# Patient Record
Sex: FEMALE | Race: WHITE | Employment: OTHER | URBAN - METROPOLITAN AREA
[De-identification: names, ages, dates, MRNs, and addresses within clinical notes are randomized per-mention and may not be internally consistent; named-entity substitution may affect disease eponyms.]

---

## 2017-01-31 DIAGNOSIS — F32.1 MODERATE SINGLE CURRENT EPISODE OF MAJOR DEPRESSIVE DISORDER (H): Primary | ICD-10-CM

## 2017-01-31 NOTE — TELEPHONE ENCOUNTER
Fluoxetine     Last Written Prescription Date: 11/2/16  Last Fill Quantity: 90, # refills: 0  [place] [facility]:  8/10/16        Last PHQ-9 score on record=   PHQ-9 SCORE 7/18/2016   Total Score -   Total Score MyChart -   Total Score 6           .Cony Granado CMA

## 2017-02-01 RX ORDER — FLUOXETINE 40 MG/1
CAPSULE ORAL
Qty: 30 CAPSULE | Refills: 0 | Status: SHIPPED | OUTPATIENT
Start: 2017-02-01 | End: 2017-02-22

## 2017-02-01 NOTE — TELEPHONE ENCOUNTER
Due for update PHQ-9 and CPE per last OV note on 8/10/16 as below    Reviewed health conditions and medications- refilled  Advise to come back for physical exam and fasting labs     Mychart message sent to patient with PHQ-9 questionnaire attached and reminder to f/u for CPE  Awaiting response    Elizabeth Rob RN

## 2017-02-01 NOTE — TELEPHONE ENCOUNTER
appt scheduled with updated PhQ-9.    PHQ-9 SCORE 12/17/2015 7/18/2016 2/1/2017   Total Score - - -   Total Score MyChart - - 5 (Mild depression)   Total Score 5 6 -     Next 5 appointments (look out 90 days)     Feb 22, 2017 10:00 AM   [facility] with Ramandeep Salmeron MD   [facility] ([facility]    [address] [place] [address] [phone]                 30 days supply given for coverage until next scheduled appointment.    Elizabeth Rob RN

## 2017-02-22 ENCOUNTER — OFFICE VISIT (OUTPATIENT)
Dept: FAMILY MEDICINE | Facility: CLINIC | Age: 52
End: 2017-02-22
Payer: COMMERCIAL

## 2017-02-22 VITALS
OXYGEN SATURATION: 97 % | DIASTOLIC BLOOD PRESSURE: 68 MMHG | HEIGHT: 66 IN | WEIGHT: 162.4 LBS | BODY MASS INDEX: 26.1 KG/M2 | SYSTOLIC BLOOD PRESSURE: 110 MMHG | HEART RATE: 94 BPM | TEMPERATURE: 98.3 F

## 2017-02-22 DIAGNOSIS — F32.1 MODERATE SINGLE CURRENT EPISODE OF MAJOR DEPRESSIVE DISORDER (H): ICD-10-CM

## 2017-02-22 DIAGNOSIS — Z00.00 ROUTINE ADULT HEALTH MAINTENANCE: Primary | ICD-10-CM

## 2017-02-22 DIAGNOSIS — Z23 NEED FOR PROPHYLACTIC VACCINATION AND INOCULATION AGAINST INFLUENZA: ICD-10-CM

## 2017-02-22 PROCEDURE — 99396 PREV VISIT EST AGE 40-64: CPT | Mod: 25 | Performed by: INTERNAL MEDICINE

## 2017-02-22 PROCEDURE — 90471 IMMUNIZATION ADMIN: CPT | Performed by: INTERNAL MEDICINE

## 2017-02-22 PROCEDURE — 90686 IIV4 VACC NO PRSV 0.5 ML IM: CPT | Performed by: INTERNAL MEDICINE

## 2017-02-22 RX ORDER — BUPROPION HYDROCHLORIDE 300 MG/1
300 TABLET ORAL EVERY MORNING
Qty: 90 TABLET | Refills: 1 | Status: SHIPPED | OUTPATIENT
Start: 2017-02-22 | End: 2017-11-23

## 2017-02-22 RX ORDER — FLUOXETINE 40 MG/1
CAPSULE ORAL
Qty: 90 CAPSULE | Refills: 1 | Status: SHIPPED | OUTPATIENT
Start: 2017-02-22 | End: 2017-09-01

## 2017-02-22 ASSESSMENT — PATIENT HEALTH QUESTIONNAIRE - PHQ9: 5. POOR APPETITE OR OVEREATING: SEVERAL DAYS

## 2017-02-22 ASSESSMENT — ANXIETY QUESTIONNAIRES
IF YOU CHECKED OFF ANY PROBLEMS ON THIS QUESTIONNAIRE, HOW DIFFICULT HAVE THESE PROBLEMS MADE IT FOR YOU TO DO YOUR WORK, TAKE CARE OF THINGS AT HOME, OR GET ALONG WITH OTHER PEOPLE: SOMEWHAT DIFFICULT
5. BEING SO RESTLESS THAT IT IS HARD TO SIT STILL: NOT AT ALL
3. WORRYING TOO MUCH ABOUT DIFFERENT THINGS: SEVERAL DAYS
7. FEELING AFRAID AS IF SOMETHING AWFUL MIGHT HAPPEN: NOT AT ALL
6. BECOMING EASILY ANNOYED OR IRRITABLE: SEVERAL DAYS
1. FEELING NERVOUS, ANXIOUS, OR ON EDGE: SEVERAL DAYS
2. NOT BEING ABLE TO STOP OR CONTROL WORRYING: SEVERAL DAYS
GAD7 TOTAL SCORE: 5

## 2017-02-22 NOTE — NURSING NOTE
"Chief Complaint   Patient presents with     Physical     fasting       Initial /68 (BP Location: Left arm, Patient Position: Chair, Cuff Size: Adult Regular)  Pulse 94  Temp 98.3  F (36.8  C) (Tympanic)  Ht 5' 6\" (1.676 m)  Wt 162 lb 6.4 oz (73.7 kg)  SpO2 97%  BMI 26.21 kg/m2 Estimated body mass index is 26.21 kg/(m^2) as calculated from the following:    Height as of this encounter: 5' 6\" (1.676 m).    Weight as of this encounter: 162 lb 6.4 oz (73.7 kg).  Medication Reconciliation: complete  "

## 2017-02-22 NOTE — PROGRESS NOTES
SUBJECTIVE:     CC: Shanique Delgado is an 51 year old woman who presents for preventive health visit.     Lives with  and two step children, ages 9 and 12.  She also has 3 grown children and 2 grandchildren. 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      Healthy Habits:  Answers for HPI/ROS submitted by the patient on 2/19/2017   Annual Exam:  Getting at least 3 servings of Calcium per day:: Yes  Bi-annual eye exam:: Yes  Dental care twice a year:: Yes  Sleep apnea or symptoms of sleep apnea:: None  Diet:: Regular (no restrictions)  Frequency of exercise:: 2-3 days/week  Taking medications regularly:: Yes  Medication side effects:: None  Additional concerns today:: No  PHQ-2 Depressed: Several days, Several days  PHQ-2 Score: 2  Duration of exercise:: Less than 15 minutes         Depression:  Shanique feels like her symptoms are well controlled on her current regimen of fluoxetine and bupropion      Today's PHQ-2 Score:   PHQ-2 ( 1999 Pf[employer]) 2/19/2017 8/10/2016   Q1: Little interest or pleasure in doing things - 2   Q2: Feeling down, depressed or hopeless - 2   PHQ-2 Score - 4   Little interest or pleasure in doing things Several days -   Feeling down, depressed or hopeless Several days -   PHQ-2 Score 2 -         Abuse: Current or Past(Physical, Sexual or Emotional)- NO  Do you feel safe in your environment - YES    Social History   Substance Use Topics     Smoking status: Never Smoker     Smokeless tobacco: Never Used     Alcohol use 0.0 oz/week     0 Standard drinks or equivalent per week      Comment: few times per week      The patient does not drink >3 drinks per day nor >7 drinks per week.    Recent Labs   Lab Test  12/18/14   1156  12/06/13   0951   CHOL  252*  191   HDL  54  41*   LDL  161*  117   TRIG  183*  163*   CHOLHDLRATIO  4.7  4.6       Reviewed orders with patient.  Reviewed health maintenance and updated orders accordingly - Yes    Mammo Decision Support:  Patient over age 50, mutual decision  "to screen reflected in health maintenance. - last one done about 3 years ago     Pertinent mammograms are reviewed under the imaging tab.  History of abnormal Pap smear: NO - age 30- 65 PAP every 3 years recommended  All Histories reviewed and updated in Epic.      ROS:  C: NEGATIVE for fever, chills, change in weight  I: NEGATIVE for worrisome rashes, moles or lesions  E: NEGATIVE for vision changes or irritation  ENT: NEGATIVE for ear, mouth and throat problems  R: NEGATIVE for significant cough or SOB  B: NEGATIVE for masses, tenderness or discharge  CV: NEGATIVE for chest pain, palpitations or peripheral edema  GI: NEGATIVE for nausea, abdominal pain, heartburn, or change in bowel habits  : NEGATIVE for unusual urinary or vaginal symptoms. Infrequent bleeding on Mirena.  M: NEGATIVE for significant arthralgias or myalgia  N: NEGATIVE for weakness, dizziness or paresthesias  P: NEGATIVE for changes in mood or affect    Problem list, Medication list, Allergies, and Medical/Social/Surgical histories reviewed in [facility] and updated as appropriate.  OBJECTIVE:     /68 (BP Location: Left arm, Patient Position: Chair, Cuff Size: Adult Regular)  Pulse 94  Temp 98.3  F (36.8  C) (Tympanic)  Ht 5' 6\" (1.676 m)  Wt 162 lb 6.4 oz (73.7 kg)  SpO2 97%  BMI 26.21 kg/m2  EXAM:GENERAL APPEARANCE: healthy, alert and no distress  EYES: Eyes grossly normal to inspection, PERRL and conjunctivae and sclerae normal  HENT: ear canals and TM's normal, mouth without ulcers or lesions, oropharynx clear and oral mucous membranes moist  NECK: no adenopathy, no asymmetry, masses, or scars and thyroid normal to palpation  RESP: lungs clear to auscultation - no rales, rhonchi or wheezes  CV: regular rate and rhythm, normal S1 S2, no S3 or S4, no murmur, click or rub, no peripheral edema and peripheral pulses strong  ABDOMEN: soft, nontender, no hepatosplenomegaly, no masses and bowel sounds normal  MS: no musculoskeletal defects are noted " "and gait is age appropriate without ataxia  SKIN: no suspicious lesions or rashes  NEURO: Normal strength and tone, sensory exam grossly normal, mentation intact and speech normal  PSYCH: mentation appears normal and affect normal/bright    ASSESSMENT/PLAN:         ICD-10-CM    1. Routine adult health maintenance Z00.00    2. Need for prophylactic vaccination and inoculation against influenza Z23    3. Moderate single current episode of major depressive disorder (H) F32.1 FLUoxetine (PROZAC) 40 MG capsule     buPROPion (WELLBUTRIN XL) 300 MG 24 hr tablet   She gets mammograms at the [facility] - will make an appointment to get mammogram soon      COUNSELING:   Reviewed preventive health counseling, as reflected in patient instructions       Regular exercise       Healthy diet/nutrition       Vaccinated for: Influenza       Osteoporosis Prevention/Bone Health       Sun screen         reports that she has never smoked. She has never used smokeless tobacco.    Estimated body mass index is 26.21 kg/(m^2) as calculated from the following:    Height as of this encounter: 5' 6\" (1.676 m).    Weight as of this encounter: 162 lb 6.4 oz (73.7 kg).       Counseling Resources:  ATP IV Guidelines  Pooled Cohorts Equation Calculator  Breast Cancer Risk Calculator  FRAX Risk Assessment  ICSI Preventive Guidelines  Dietary Guidelines for Americans, 2010  USDA's MyPlate  ASA Prophylaxis  Lung CA Screening    Ramandeep Salmeron MD  [facility]  "

## 2017-02-22 NOTE — PROGRESS NOTES
Injectable Influenza Immunization Documentation    1.  Is the person to be vaccinated sick today?  No    2. Does the person to be vaccinated have an allergy to eggs or to a component of the vaccine?  No    3. Has the person to be vaccinated today ever had a serious reaction to influenza vaccine in the past?  No    4. Has the person to be vaccinated ever had Guillain-[place] syndrome?  No     Form completed by Amy Clarke

## 2017-02-22 NOTE — MR AVS SNAPSHOT
After Visit Summary   2/22/2017    Shanique Delgado    MRN: [id number]           Patient Information     Date Of Birth          1965        Visit Information        Provider Department      2/22/2017 10:00 AM Ramandeep Salmeron MD [facility]        Today's Diagnoses     Visit for screening mammogram        Need for prophylactic vaccination and inoculation against influenza        Moderate single current episode of major depressive disorder (H)          Care Instructions      Preventive Health Recommendations  Female Ages 50 - 64    Yearly exam: See your health care provider every year in order to  o Review health changes.   o Discuss preventive care.    o Review your medicines if your doctor has prescribed any.      Get a Pap test every three years (unless you have an abnormal result and your provider advises testing more often).    If you get Pap tests with HPV test, you only need to test every 5 years, unless you have an abnormal result.     You do not need a Pap test if your uterus was removed (hysterectomy) and you have not had cancer.    You should be tested each year for STDs (sexually transmitted diseases) if you're at risk.     Have a mammogram every 1 to 2 years.    Have a colonoscopy at age 50, or have a yearly FIT test (stool test). These exams screen for colon cancer.      Have a cholesterol test every 5 years, or more often if advised.    Have a diabetes test (fasting glucose) every three years. If you are at risk for diabetes, you should have this test more often.     If you are at risk for osteoporosis (brittle bone disease), think about having a bone density scan (DEXA).    Shots: Get a flu shot each year. Get a tetanus shot every 10 years.    Nutrition:     Eat at least 5 servings of fruits and vegetables each day.    Eat whole-grain bread, whole-wheat pasta and brown rice instead of white grains and rice.    Talk to your provider about Calcium and Vitamin  "D.     Lifestyle    Exercise at least 150 minutes a week (30 minutes a day, 5 days a week). This will help you control your weight and prevent disease.    Limit alcohol to one drink per day.    No smoking.     Wear sunscreen to prevent skin cancer.     See your dentist every six months for an exam and cleaning.    See your eye doctor every 1 to 2 years.          Follow-ups after your visit        Follow-up notes from your care team     Return in about 6 months (around 8/22/2017) for depression.      Who to contact     If you have questions or need follow up information about today's clinic visit or your schedule please contact [facility] [place] directly at [phone].  Normal or non-critical lab and imaging results will be communicated to you by [employer], letter or phone within 4 business days after the clinic has received the results. If you do not hear from us within 7 days, please contact the clinic through [employer] or phone. If you have a critical or abnormal lab result, we will notify you by phone as soon as possible.  Submit refill requests through [employer] or call your pharmacy and they will forward the refill request to us. Please allow 3 business days for your refill to be completed.          Additional Information About Your Visit        [employer] Information     [employer] gives you secure access to your electronic health record. If you see a primary care provider, you can also send messages to your care team and make appointments. If you have questions, please call your primary care clinic.  If you do not have a primary care provider, please call [phone] and they will assist you.        Care EveryWhere ID     This is your Care EveryWhere ID. This could be used by other organizations to access your [facility] medical records  [id number]        Your Vitals Were     Pulse Temperature Height Pulse Oximetry BMI (Body Mass Index)       94 98.3  F (36.8  C) (Tympanic) 5' 6\" (1.676 m) 97% 26.21 kg/m2  "       Blood Pressure from Last 3 Encounters:   02/22/17 110/68   08/10/16 109/75   03/28/16 108/76    Weight from Last 3 Encounters:   02/22/17 162 lb 6.4 oz (73.7 kg)   08/10/16 158 lb (71.7 kg)   03/28/16 158 lb (71.7 kg)              Today, you had the following     No orders found for display         Today's Medication Changes          These changes are accurate as of: 2/22/17 10:23 AM.  If you have any questions, ask your nurse or doctor.               These medicines have changed or have updated prescriptions.        Dose/Directions    buPROPion 300 MG 24 hr tablet   Commonly known as:  WELLBUTRIN XL   This may have changed:  See the new instructions.   Used for:  Moderate single current episode of major depressive disorder (H)   Changed by:  Ramandeep Salmeron MD        Dose:  300 mg   Take 1 tablet (300 mg) by mouth every morning   Quantity:  90 tablet   Refills:  1       FLUoxetine 40 MG capsule   Commonly known as:  PROzac   This may have changed:  See the new instructions.   Used for:  Moderate single current episode of major depressive disorder (H)   Changed by:  Ramandeep Salmeron MD        TAKE 1 CAPSULE (40 MG) BY MOUTH DAILY   Quantity:  90 capsule   Refills:  1            Where to get your medicines      These medications were sent to [place] [address] [place] [address] [address]     Phone:  [phone]     buPROPion 300 MG 24 hr tablet    FLUoxetine 40 MG capsule                Primary Care Provider Office Phone # Fax #    Jesse Davis PA-C [phone]       [facility] [address] [place] [address] [address]  [place] [address]        Thank you!     Thank you for choosing [facility]  for your care. Our goal is always to provide you with excellent care. Hearing back from our patients is one way we can continue to improve our services. Please take a few minutes to complete the written survey  that you may receive in the mail after your visit with us. Thank you!             Your Updated Medication List - Protect others around you: Learn how to safely use, store and throw away your medicines at www.disposemymeds.org.          This list is accurate as of: 2/22/17 10:23 AM.  Always use your most recent med list.                   Brand Name Dispense Instructions for use    buPROPion 300 MG 24 hr tablet    WELLBUTRIN XL    90 tablet    Take 1 tablet (300 mg) by mouth every morning       FLUoxetine 40 MG capsule    PROzac    90 capsule    TAKE 1 CAPSULE (40 MG) BY MOUTH DAILY       MIRENA (52 MG) 20 MCG/24HR IUD   Generic drug:  levonorgestrel

## 2017-02-23 ASSESSMENT — ANXIETY QUESTIONNAIRES: GAD7 TOTAL SCORE: 5

## 2017-02-23 ASSESSMENT — PATIENT HEALTH QUESTIONNAIRE - PHQ9: SUM OF ALL RESPONSES TO PHQ QUESTIONS 1-9: 4

## 2017-03-27 ENCOUNTER — HOSPITAL ENCOUNTER (EMERGENCY)
Facility: CLINIC | Age: 52
Discharge: HOME | End: 2017-03-27
Attending: EMERGENCY MEDICINE | Admitting: EMERGENCY MEDICINE
Payer: COMMERCIAL

## 2017-03-27 ENCOUNTER — APPOINTMENT (OUTPATIENT)
Dept: GENERAL RADIOLOGY | Facility: CLINIC | Age: 52
End: 2017-03-27
Attending: EMERGENCY MEDICINE
Payer: COMMERCIAL

## 2017-03-27 VITALS
WEIGHT: 160 LBS | SYSTOLIC BLOOD PRESSURE: 144 MMHG | OXYGEN SATURATION: 95 % | TEMPERATURE: 98.1 F | DIASTOLIC BLOOD PRESSURE: 94 MMHG | RESPIRATION RATE: 21 BRPM | HEIGHT: 65 IN | BODY MASS INDEX: 26.66 KG/M2

## 2017-03-27 DIAGNOSIS — K21.9 GASTROESOPHAGEAL REFLUX DISEASE, ESOPHAGITIS PRESENCE NOT SPECIFIED: ICD-10-CM

## 2017-03-27 DIAGNOSIS — R07.89 CHEST PRESSURE: ICD-10-CM

## 2017-03-27 LAB
ANION GAP SERPL CALCULATED.3IONS-SCNC: 9 MMOL/L (ref 3–14)
BASOPHILS # BLD AUTO: 0 10E9/L (ref 0–0.2)
BASOPHILS NFR BLD AUTO: 0.4 %
BUN SERPL-MCNC: 12 MG/DL (ref 7–30)
CALCIUM SERPL-MCNC: 9.2 MG/DL (ref 8.5–10.1)
CHLORIDE SERPL-SCNC: 104 MMOL/L (ref 94–109)
CO2 SERPL-SCNC: 25 MMOL/L (ref 20–32)
CREAT SERPL-MCNC: 0.73 MG/DL (ref 0.52–1.04)
DIFFERENTIAL METHOD BLD: NORMAL
EOSINOPHIL # BLD AUTO: 0.3 10E9/L (ref 0–0.7)
EOSINOPHIL NFR BLD AUTO: 3.6 %
ERYTHROCYTE [DISTWIDTH] IN BLOOD BY AUTOMATED COUNT: 12.4 % (ref 10–15)
GFR SERPL CREATININE-BSD FRML MDRD: 85 ML/MIN/1.7M2
GLUCOSE SERPL-MCNC: 87 MG/DL (ref 70–99)
HCT VFR BLD AUTO: 40 % (ref 35–47)
HGB BLD-MCNC: 14.1 G/DL (ref 11.7–15.7)
IMM GRANULOCYTES # BLD: 0 10E9/L (ref 0–0.4)
IMM GRANULOCYTES NFR BLD: 0.5 %
INTERPRETATION ECG - MUSE: NORMAL
LYMPHOCYTES # BLD AUTO: 2.6 10E9/L (ref 0.8–5.3)
LYMPHOCYTES NFR BLD AUTO: 34 %
MCH RBC QN AUTO: 31.4 PG (ref 26.5–33)
MCHC RBC AUTO-ENTMCNC: 35.3 G/DL (ref 31.5–36.5)
MCV RBC AUTO: 89 FL (ref 78–100)
MONOCYTES # BLD AUTO: 0.4 10E9/L (ref 0–1.3)
MONOCYTES NFR BLD AUTO: 5.2 %
NEUTROPHILS # BLD AUTO: 4.3 10E9/L (ref 1.6–8.3)
NEUTROPHILS NFR BLD AUTO: 56.3 %
NRBC # BLD AUTO: 0 10*3/UL
NRBC BLD AUTO-RTO: 0 /100
PLATELET # BLD AUTO: 258 10E9/L (ref 150–450)
POTASSIUM SERPL-SCNC: 3.8 MMOL/L (ref 3.4–5.3)
RBC # BLD AUTO: 4.49 10E12/L (ref 3.8–5.2)
SODIUM SERPL-SCNC: 138 MMOL/L (ref 133–144)
TROPONIN I SERPL-MCNC: NORMAL UG/L (ref 0–0.04)
TROPONIN I SERPL-MCNC: NORMAL UG/L (ref 0–0.04)
WBC # BLD AUTO: 7.6 10E9/L (ref 4–11)

## 2017-03-27 PROCEDURE — 71020 XR CHEST 2 VW: CPT

## 2017-03-27 PROCEDURE — 25000132 ZZH RX MED GY IP 250 OP 250 PS 637: Performed by: EMERGENCY MEDICINE

## 2017-03-27 PROCEDURE — 80048 BASIC METABOLIC PNL TOTAL CA: CPT | Performed by: EMERGENCY MEDICINE

## 2017-03-27 PROCEDURE — 99285 EMERGENCY DEPT VISIT HI MDM: CPT | Mod: 25

## 2017-03-27 PROCEDURE — 25000125 ZZHC RX 250: Performed by: EMERGENCY MEDICINE

## 2017-03-27 PROCEDURE — 84484 ASSAY OF TROPONIN QUANT: CPT | Mod: 91 | Performed by: EMERGENCY MEDICINE

## 2017-03-27 PROCEDURE — 85025 COMPLETE CBC W/AUTO DIFF WBC: CPT | Performed by: EMERGENCY MEDICINE

## 2017-03-27 PROCEDURE — 93005 ELECTROCARDIOGRAM TRACING: CPT

## 2017-03-27 RX ORDER — ASPIRIN 81 MG/1
162 TABLET, CHEWABLE ORAL ONCE
Status: COMPLETED | OUTPATIENT
Start: 2017-03-27 | End: 2017-03-27

## 2017-03-27 RX ADMIN — ASPIRIN 81 MG 162 MG: 81 TABLET ORAL at 18:53

## 2017-03-27 RX ADMIN — LIDOCAINE HYDROCHLORIDE 30 ML: 20 SOLUTION ORAL; TOPICAL at 18:53

## 2017-03-27 ASSESSMENT — ENCOUNTER SYMPTOMS
ABDOMINAL PAIN: 0
COUGH: 1
SHORTNESS OF BREATH: 0
FEVER: 0

## 2017-03-27 NOTE — ED NOTES
IV inserted on first attempt. Blood specimens obtained and sent to lab. EKG has been completed and reviewed by MD. Call bell within reach.  at bedside.

## 2017-03-27 NOTE — ED NOTES
"Pt presents to ED with c/o sternal/left sided chest pain. She reports having intermittent \"heartburn\" for several hours last night. When she woke up this morning, the heartburn was gone. At approximately 10am while at rest she developed sternal chest pain that radiated into the right side of her neck. (+) nausea. (-) vomiting, shortness of breath, pleuritic pain, recent illness. LMP = amenorrhea due to IUD. Denies mother/father/sibling cardiac disease (other than father having pacemaker). Denies ETOH or tobacco use, but does report endorsing THC on Saturday. LCTAB. HRRR.  "

## 2017-03-27 NOTE — ED PROVIDER NOTES
"  History     Chief Complaint:  Chest pain    HPI   Shanique Delgado is a 51 year old female who presents with chest pain. Yesterday, the patient experienced heart burn that alleviated on its own. She states that she does not have a history of heart burn. Around 1000 this morning, the patient began experiencing waxing and waning pain in the middle of her chest that radiates into the right side of her jaw and right arm. The pain is not exacerbated with deep breathing or exertion. No abdominal pain. No shortness of breath or leg swelling. The patient does not have a history of blood clots in her legs or lungs. No fevers or cough. No increased stress. The patient took ibuprofen with no relief in her symptoms.     Cardiac Risk Factors   Sex: female   Tobacco: Negative   Hypertension: Negative  Diabetes: Negative  Hyperlipidemia: Negative  Family History: Negative    Allergies:  No known drug allergies.     Medications:    Prozac  Wellbutrin  Mirena     Past Medical History:    Depressive disorder  IUD  Major depression  Anxiety    Past Surgical History:    Cholecystectomy    Family History:    Breast cancer - mother    Social History:  Marital Status:   Presents to the ED with her   Tobacco Use: negative  Alcohol Use: positive  PCP: Jesse Davis    Review of Systems   Constitutional: Negative for fever.   Respiratory: Positive for cough. Negative for shortness of breath.    Cardiovascular: Positive for chest pain. Negative for leg swelling.   Gastrointestinal: Negative for abdominal pain.   All other systems reviewed and are negative.    Physical Exam   First Vitals:  BP: 155/84  Heart Rate: 85  Temp: 98.1  F (36.7  C)  Resp: 18  Height: 165.1 cm (5' 5\")  Weight: 72.6 kg (160 lb)  SpO2: 99 %    Physical Exam  General: Alert and cooperative with exam. Patient in mild distress. Normal mentation  Head:  Scalp is NC/AT  Eyes:  No scleral icterus, PERRL  ENT:  The external nose and ears are normal. " The oropharynx is normal and without erythema; mucus membranes are moist.   CV:  Regular rate and rhythm    No pathologic murmur   Resp:  Breath sounds are clear bilaterally    Non-labored, no retractions or accessory muscle use  GI:  Abdomen is soft, no distension, no tenderness.   MS:  No lower extremity edema   Skin:  Warm and dry, No rash or lesions noted.  Neuro: Oriented x 3. No gross motor deficits.    Emergency Department Course   ECG:  @ 1747  Indication: chest pain  Vent. Rate 85 bpm. MS interval 160 ms. QRS duration 86 ms. QT/QTc 388/461 ms. P-R-T axis 42 7 42.   Normal sinus rhythm. Normal ECG.    Read @ 1827 by Dr. Wise.    Imaging:  Radiographic findings were communicated with the patient who voiced understanding of the findings.    Chest XR, PA & LAT  High attenuation nodule laterally in the mid left thorax  is probably a calcified granuloma about 1 cm diameter. Right lung is  clear. Normal heart size and pulmonary vascularity.   Report per radiology.    Laboratory:  CBC:  WBC 7.6, HGB 14.1, , otherwise WNL  BMP: WNL (Creatinine 0.73)  (1940) Troponin: <0.015  (1805) Troponin: <0.015    Interventions:  (1853) Xylocaine 2% 15mL, Mylanta 15mL PO   (1853) Aspirin, 162 mg, PO    Emergency Department Course:  Nursing notes and vitals reviewed.  I performed an exam of the patient as documented above.  EKG was done, interpretation as above.  A peripheral IV was established. Blood was drawn from the patient. This was sent for laboratory testing, findings above.   The patient was sent for a chest x-ray while in the emergency department, findings above.   Findings and plan explained to the patient. Patient discharged home with instructions regarding supportive care, medications, and reasons to return. The importance of close follow-up was reviewed. The patient was prescribed omeprazole.    Impression & Plan    Medical Decision Making:  Patient is a 51 year old female who presents with chest pressure.  Patient s medical history and records reviewed. Initial consideration for, but not limited to, ACS/MI, esophageal spasm/GERD, musculoskeletal pain, pericarditis, myocarditis, among others. Labs, EKG, and imaging was obtained. EKG shows no evidence of acute ischemia or infarction. Lab unremarkable as noted above including negative troponin x2. Chest x-ray without evidence of acute pathology. Patient was provided 162 mg of aspirin. Was provided a GI cocktail with near complete resolution of symptoms. Patient has no cardiac risk factors. Did consider PE, however dx not clinically fit with patient s history or physical exam and again patient without risk factors for PE. Patient s HEART score equals 2. At this time, I have low suspicion for cardiac etiology/ACS. Presentation most consistent with GERD. Prescribed omeprazole and recommended to take Pepcid as needed for breakthrough symptoms. Patient to follow up with PCP in 3-5 days for reevaluation. Patient discharged to home. Return precautions discussed.    Diagnosis:    ICD-10-CM    1. Chest pressure R07.89    2. Gastroesophageal reflux disease, esophagitis presence not specified K21.9        Disposition:  Discharge to home.    Discharge Medications:  New Prescriptions    OMEPRAZOLE (PRILOSEC) 20 MG CR CAPSULE    Take 1 capsule (20 mg) by mouth daily       Ej GOMEZ, am serving as a scribe on 3/27/2017 at 5:59 PM to personally document services performed by Dr. Wise based on my observations and the provider's statements to me.         Davey Wise, DO  03/28/17 0940

## 2017-03-27 NOTE — ED AVS SNAPSHOT
Emergency Department    [address] [place] [address]    Phone:  [phone]    Fax:  [phone]                                       Shanique Delgado   MRN: [id number]    Department:   Emergency Department   Date of Visit:  3/27/2017           After Visit Summary Signature Page     I have received my discharge instructions, and my questions have been answered. I have discussed any challenges I see with this plan with the nurse or doctor.    ..........................................................................................................................................  Patient/Patient Representative Signature      ..........................................................................................................................................  Patient Representative Print Name and Relationship to Patient    ..................................................               ................................................  Date                                            Time    ..........................................................................................................................................  Reviewed by Signature/Title    ...................................................              ..............................................  Date                                                            Time

## 2017-03-27 NOTE — ED AVS SNAPSHOT
Emergency Department    [address] [facility] [address]    Phone:  [phone]    Fax:  [phone]                                       Shanique Delgado   MRN: [id number]    Department:   Emergency Department   Date of Visit:  3/27/2017           Patient Information     Date Of Birth          1965        Your diagnoses for this visit were:     Chest pressure     Gastroesophageal reflux disease, esophagitis presence not specified        You were seen by Davey Wise DO.      Follow-up Information     Follow up with Jesse Davis PA-C In 3 days.    Specialty:  Physician Assistant    Contact information:    [facility] [place]  [address] [place] [address] DR  [place] [address] [phone]          Follow up with  Emergency Department.    Specialty:  EMERGENCY MEDICINE    Why:  If symptoms worsen    Contact information:    [address] [facility] [id number] [phone]        Discharge Instructions         Tips to Control Acid Reflux  To control acid reflux, you ll need to make some basic diet and lifestyle changes. The simple steps outlined below may be all you ll need to relieve discomfort.  Watch What You Eat      Avoid fatty foods and spicy foods.    Eat fewer acidic foods, such as citrus and tomato-based foods. These can increase symptoms.    Limit drinking alcohol, caffeine, and fizzy beverages. All increase acid reflux.    Try limiting chocolate, peppermint, and spearmint. These can worsen acid reflux in some people.  Watch When You Eat    Avoid lying down for 3 hours after eating.    Do not snack before going to bed.  Raise Your Head    Raising your head and upper body by 4 inches to 6 inches helps limit reflux when you re lying down. Put blocks under the head of the bed frame to raise it.  Other Changes    Lose weight, if you need to.    Don t work out near bedtime.    Avoid tight-fitting clothes.    Limit aspirin and  ibuprofen.    Stop smoking.     5067-1228 [employer]. [address] [place] [address], [place] [address]. All rights reserved. This information is not intended as a substitute for professional medical care. Always follow your healthcare professional's instructions.    Return to the emergency department or seek medical care as instructed if your symptoms fail to improve or significantly worsen.    Take omeprazole daily.    May take famotidine (Pepcid) as needed for breakthrough symptoms     Follow-up as indicated on page 1.    Maintain adequate hydration and get plenty of rest.        Discharge References/Attachments     GERD (ADULT) (ENGLISH)      24 Hour Appointment Hotline       To make an appointment at any [facility] clinic, call [phone] ([phone]). If you don't have a family doctor or clinic, we will help you find one. [facility] clinics are conveniently located to serve the needs of you and your family.             Review of your medicines      START taking        Dose / Directions Last dose taken    omeprazole 20 MG CR capsule   Commonly known as:  priLOSEC   Dose:  20 mg   Quantity:  30 capsule        Take 1 capsule (20 mg) by mouth daily   Refills:  0          Our records show that you are taking the medicines listed below. If these are incorrect, please call your family doctor or clinic.        Dose / Directions Last dose taken    buPROPion 300 MG 24 hr tablet   Commonly known as:  WELLBUTRIN XL   Dose:  300 mg   Quantity:  90 tablet        Take 1 tablet (300 mg) by mouth every morning   Refills:  1        FLUoxetine 40 MG capsule   Commonly known as:  PROzac   Quantity:  90 capsule        TAKE 1 CAPSULE (40 MG) BY MOUTH DAILY   Refills:  1        MIRENA (52 MG) 20 MCG/24HR IUD   Generic drug:  levonorgestrel        Refills:  0                Prescriptions were sent or printed at these locations (1 Prescription)                   Other Prescriptions                Printed at Department/Unit  printer (1 of 1)         omeprazole (PRILOSEC) 20 MG CR capsule                Procedures and tests performed during your visit     Procedure/Test Number of Times Performed    Basic metabolic panel 1    CBC with platelets + differential 1    Chest XR,  PA & LAT 1    EKG 12 lead 1    Troponin I (now) 2      Orders Needing Specimen Collection     None      Pending Results     No orders found from 3/25/2017 to 3/28/2017.            Pending Culture Results     No orders found from 3/25/2017 to 3/28/2017.             Test Results from your hospital stay     3/27/2017  6:15 PM - Interface, Flexilab Results      Component Results     Component Value Ref Range & Units Status    WBC 7.6 4.0 - 11.0 10e9/L Final    RBC Count 4.49 3.8 - 5.2 10e12/L Final    Hemoglobin 14.1 11.7 - 15.7 g/dL Final    Hematocrit 40.0 35.0 - 47.0 % Final    MCV 89 78 - 100 fl Final    MCH 31.4 26.5 - 33.0 pg Final    MCHC 35.3 31.5 - 36.5 g/dL Final    RDW 12.4 10.0 - 15.0 % Final    Platelet Count 258 150 - 450 10e9/L Final    Diff Method Automated Method  Final    % Neutrophils 56.3 % Final    % Lymphocytes 34.0 % Final    % Monocytes 5.2 % Final    % Eosinophils 3.6 % Final    % Basophils 0.4 % Final    % Immature Granulocytes 0.5 % Final    Nucleated RBCs 0 0 /100 Final    Absolute Neutrophil 4.3 1.6 - 8.3 10e9/L Final    Absolute Lymphocytes 2.6 0.8 - 5.3 10e9/L Final    Absolute Monocytes 0.4 0.0 - 1.3 10e9/L Final    Absolute Eosinophils 0.3 0.0 - 0.7 10e9/L Final    Absolute Basophils 0.0 0.0 - 0.2 10e9/L Final    Abs Immature Granulocytes 0.0 0 - 0.4 10e9/L Final    Absolute Nucleated RBC 0.0  Final         3/27/2017  6:28 PM - Interface, Flexilab Results      Component Results     Component Value Ref Range & Units Status    Sodium 138 133 - 144 mmol/L Final    Potassium 3.8 3.4 - 5.3 mmol/L Final    Chloride 104 94 - 109 mmol/L Final    Carbon Dioxide 25 20 - 32 mmol/L Final    Anion Gap 9 3 - 14 mmol/L Final    Glucose 87 70 - 99 mg/dL  Final    Urea Nitrogen 12 7 - 30 mg/dL Final    Creatinine 0.73 0.52 - 1.04 mg/dL Final    GFR Estimate 85 >60 mL/min/1.7m2 Final    Non  GFR Calc    GFR Estimate If Black >90   GFR Calc   >60 mL/min/1.7m2 Final    Calcium 9.2 8.5 - 10.1 mg/dL Final         3/27/2017  6:34 PM - Interface, Flexilab Results      Component Results     Component Value Ref Range & Units Status    Troponin I ES  0.000 - 0.045 ug/L Final    <0.015  The 99th percentile for upper reference range is 0.045 ug/L.  Troponin values in   the range of 0.045 - 0.120 ug/L may be associated with risks of adverse   clinical events.           3/27/2017  7:23 PM - Interface, Radiant Ib      Narrative     CHEST TWO VIEWS  3/27/2017 6:43 PM     HISTORY: Chest pain.    COMPARISON: None.        Impression     IMPRESSION: High attenuation nodule laterally in the mid left thorax  is probably a calcified granuloma about 1 cm diameter. Right lung is  clear. Normal heart size and pulmonary vascularity.     DAYNA LANE MD         3/27/2017  8:13 PM - Interface, Flexilab Results      Component Results     Component Value Ref Range & Units Status    Troponin I ES  0.000 - 0.045 ug/L Final    <0.015  The 99th percentile for upper reference range is 0.045 ug/L.  Troponin values in   the range of 0.045 - 0.120 ug/L may be associated with risks of adverse   clinical events.                  Clinical Quality Measure: Blood Pressure Screening     Your blood pressure was checked while you were in the emergency department today. The last reading we obtained was  BP: 142/89 . Please read the guidelines below about what these numbers mean and what you should do about them.  If your systolic blood pressure (the top number) is less than 120 and your diastolic blood pressure (the bottom number) is less than 80, then your blood pressure is normal. There is nothing more that you need to do about it.  If your systolic blood pressure (the top  number) is 120-139 or your diastolic blood pressure (the bottom number) is 80-89, your blood pressure may be higher than it should be. You should have your blood pressure rechecked within a year by a primary care provider.  If your systolic blood pressure (the top number) is 140 or greater or your diastolic blood pressure (the bottom number) is 90 or greater, you may have high blood pressure. High blood pressure is treatable, but if left untreated over time it can put you at risk for heart attack, stroke, or kidney failure. You should have your blood pressure rechecked by a primary care provider within the next 4 weeks.  If your provider in the emergency department today gave you specific instructions to follow-up with your doctor or provider even sooner than that, you should follow that instruction and not wait for up to 4 weeks for your follow-up visit.        Thank you for choosing [facility]       Thank you for choosing [facility] for your care. Our goal is always to provide you with excellent care. Hearing back from our patients is one way we can continue to improve our services. Please take a few minutes to complete the written survey that you may receive in the mail after you visit with us. Thank you!        [employer] Information     [employer] gives you secure access to your electronic health record. If you see a primary care provider, you can also send messages to your care team and make appointments. If you have questions, please call your primary care clinic.  If you do not have a primary care provider, please call [phone] and they will assist you.        Care EveryWhere ID     This is your Care EveryWhere ID. This could be used by other organizations to access your [facility] medical records  [id number]        After Visit Summary       This is your record. Keep this with you and show to your community pharmacist(s) and doctor(s) at your next visit.

## 2017-03-28 NOTE — DISCHARGE INSTRUCTIONS
Tips to Control Acid Reflux  To control acid reflux, you ll need to make some basic diet and lifestyle changes. The simple steps outlined below may be all you ll need to relieve discomfort.  Watch What You Eat      Avoid fatty foods and spicy foods.    Eat fewer acidic foods, such as citrus and tomato-based foods. These can increase symptoms.    Limit drinking alcohol, caffeine, and fizzy beverages. All increase acid reflux.    Try limiting chocolate, peppermint, and spearmint. These can worsen acid reflux in some people.  Watch When You Eat    Avoid lying down for 3 hours after eating.    Do not snack before going to bed.  Raise Your Head    Raising your head and upper body by 4 inches to 6 inches helps limit reflux when you re lying down. Put blocks under the head of the bed frame to raise it.  Other Changes    Lose weight, if you need to.    Don t work out near bedtime.    Avoid tight-fitting clothes.    Limit aspirin and ibuprofen.    Stop smoking.     0143-0888 [employer]. [address] [place] [address], [place] [address]. All rights reserved. This information is not intended as a substitute for professional medical care. Always follow your healthcare professional's instructions.    Return to the emergency department or seek medical care as instructed if your symptoms fail to improve or significantly worsen.    Take omeprazole daily.    May take famotidine (Pepcid) as needed for breakthrough symptoms     Follow-up as indicated on page 1.    Maintain adequate hydration and get plenty of rest.

## 2017-03-28 NOTE — ED NOTES
Pt reports improvement, although not complete resolution, of pain post GI cocktail. MD aware. Pending dispo/additional orders.

## 2017-03-28 NOTE — ED NOTES
Patient discharged in stable condition. Patient received follow-up instructions and 1RXs. No questions at time of discharge. IV removed - catheter intact.

## 2017-05-02 ENCOUNTER — TELEPHONE (OUTPATIENT)
Dept: FAMILY MEDICINE | Facility: CLINIC | Age: 52
End: 2017-05-02

## 2017-05-02 NOTE — TELEPHONE ENCOUNTER
5/2/2017    Patient refuse Mammogram and does not want to schedule with [facility]. DO NOT CALL!      Outreach ,  Kirk Adams

## 2017-09-01 ENCOUNTER — MYC MEDICAL ADVICE (OUTPATIENT)
Dept: FAMILY MEDICINE | Facility: CLINIC | Age: 52
End: 2017-09-01

## 2017-09-01 DIAGNOSIS — F32.1 MODERATE SINGLE CURRENT EPISODE OF MAJOR DEPRESSIVE DISORDER (H): ICD-10-CM

## 2017-09-01 ASSESSMENT — PATIENT HEALTH QUESTIONNAIRE - PHQ9
SUM OF ALL RESPONSES TO PHQ QUESTIONS 1-9: 6
SUM OF ALL RESPONSES TO PHQ QUESTIONS 1-9: 6
10. IF YOU CHECKED OFF ANY PROBLEMS, HOW DIFFICULT HAVE THESE PROBLEMS MADE IT FOR YOU TO DO YOUR WORK, TAKE CARE OF THINGS AT HOME, OR GET ALONG WITH OTHER PEOPLE: SOMEWHAT DIFFICULT

## 2017-09-01 NOTE — TELEPHONE ENCOUNTER
Fluoxetine     Last Written Prescription Date: 2/22/17  Last Fill Quantity: 90, # refills: 1  Last Office Visit with [facility] primary care provider:  2/22/17        Last PHQ-9 score on record=   PHQ-9 SCORE 2/22/2017   Total Score -   Total Score MyChart -   Total Score 4             Cony Granado CMA

## 2017-09-02 ASSESSMENT — PATIENT HEALTH QUESTIONNAIRE - PHQ9: SUM OF ALL RESPONSES TO PHQ QUESTIONS 1-9: 6

## 2017-09-05 NOTE — TELEPHONE ENCOUNTER
PHQ-9 SCORE 2/1/2017 2/22/2017 9/1/2017   Total Score - - -   Total Score MyChart 5 (Mild depression) - 6 (Mild depression)   Total Score - 4 6

## 2017-09-06 RX ORDER — FLUOXETINE 40 MG/1
CAPSULE ORAL
Qty: 90 CAPSULE | Refills: 1 | Status: SHIPPED | OUTPATIENT
Start: 2017-09-06 | End: 2018-03-01

## 2017-09-06 NOTE — TELEPHONE ENCOUNTER
Please see [employer] message and advise. Patient requesting a refill in another message.      Thank you,  Julisa Villagomez RN  [facility]  [phone]

## 2017-11-21 ENCOUNTER — MYC REFILL (OUTPATIENT)
Dept: FAMILY MEDICINE | Facility: CLINIC | Age: 52
End: 2017-11-21

## 2017-11-21 DIAGNOSIS — F32.1 MODERATE SINGLE CURRENT EPISODE OF MAJOR DEPRESSIVE DISORDER (H): ICD-10-CM

## 2017-11-21 RX ORDER — FLUOXETINE 40 MG/1
CAPSULE ORAL
Qty: 90 CAPSULE | Refills: 1 | Status: CANCELLED | OUTPATIENT
Start: 2017-11-21

## 2017-11-21 NOTE — TELEPHONE ENCOUNTER
Message from [employer]:  Original authorizing provider: SUMIT Posadas would like a refill of the following medications:  FLUoxetine (PROZAC) 40 MG capsule [Jesse Davis PA-C]    Preferred pharmacy: [facility] [address] [address]    Comment:  Hello! Coming up on needing refills. Please advise. Thanks!    Medication renewals requested in this message routed to other providers:  buPROPion (WELLBUTRIN XL) 300 MG 24 hr tablet [Ramandeep Salmeron MD]

## 2017-11-21 NOTE — TELEPHONE ENCOUNTER
Requested Prescriptions   Pending Prescriptions Disp Refills     buPROPion (WELLBUTRIN XL) 300 MG 24 hr tablet  Last Written Prescription Date:  2/22/2017  Last Fill Quantity: 90 tablet,  # refills: 1   Last Office Visit with [facility], P or [facility] prescribing provider:  2/22/2017   Future Office Visit:      90 tablet 1     Sig: Take 1 tablet (300 mg) by mouth every morning    SSRIs Protocol Failed    11/21/2017 10:07 AM       Failed - PHQ-9 score less than 5 in past 6 months    Please review PHQ-9 score.   PHQ-9 SCORE 2/1/2017 2/22/2017 9/1/2017   Total Score - - -   Total Score MyChart 5 (Mild depression) - 6 (Mild depression)   Total Score - 4 6     NICOLAS-7 SCORE 12/18/2014 6/15/2015 2/22/2017   Total Score 3 4 -   Total Score - - 5          Failed - Medication is NOT Bupropion    If the medication is Bupropion (Wellbutrin), and the patient is taking for smoking cessation; OK to refill.         Failed - Recent (6 mo) or future visit with authorizing provider's specialty    Patient had office visit in the last 6 months or has a visit in the next 30 days with authorizing provider.  See chart review.            Passed - Patient is age 18 or older       Passed - No active pregnancy on record       Passed - No positive pregnancy test in last 12 months

## 2017-11-21 NOTE — TELEPHONE ENCOUNTER
Requested Prescriptions   Pending Prescriptions Disp Refills     FLUoxetine (PROZAC) 40 MG capsule  Medication may not be due for a refill.  Last Written Prescription Date:  9/6/2017  Last Fill Quantity: 90 capsule,  # refills: 1   Last Office Visit with [facility], P or [facility] prescribing provider:  2/22/2017   Future Office Visit:      90 capsule 1    SSRIs Protocol Failed    11/21/2017 10:07 AM       Failed - PHQ-9 score less than 5 in past 6 months    Please review PHQ-9 score.   PHQ-9 SCORE 2/1/2017 2/22/2017 9/1/2017   Total Score - - -   Total Score MyChart 5 (Mild depression) - 6 (Mild depression)   Total Score - 4 6     NICOLAS-7 SCORE 12/18/2014 6/15/2015 2/22/2017   Total Score 3 4 -   Total Score - - 5          Failed - Recent (6 mo) or future visit with authorizing provider's specialty    Patient had office visit in the last 6 months or has a visit in the next 30 days with authorizing provider.  See chart review.            Passed - Medication is NOT Bupropion    If the medication is Bupropion (Wellbutrin), and the patient is taking for smoking cessation; OK to refill.         Passed - Patient is age 18 or older       Passed - No active pregnancy on record       Passed - No positive pregnancy test in last 12 months

## 2017-11-21 NOTE — TELEPHONE ENCOUNTER
Message from [employer]:  Original authorizing provider: MD Freda Pisanocaridad BARRERAOdalys CorderoDelgado would like a refill of the following medications:  buPROPion (WELLBUTRIN XL) 300 MG 24 hr tablet [Ramandeep Salmeron MD]    Preferred pharmacy: [facility] [address] [address]    Comment:  Hello! Coming up on needing refills. Please advise. Thanks!    Medication renewals requested in this message routed to other providers:  FLUoxetine (PROZAC) 40 MG capsule [Jesse Davis PA-C]

## 2017-11-22 RX ORDER — BUPROPION HYDROCHLORIDE 300 MG/1
300 TABLET ORAL EVERY MORNING
Qty: 90 TABLET | Refills: 1 | Status: CANCELLED | OUTPATIENT
Start: 2017-11-22

## 2018-02-21 DIAGNOSIS — F32.1 MODERATE SINGLE CURRENT EPISODE OF MAJOR DEPRESSIVE DISORDER (H): ICD-10-CM

## 2018-02-21 NOTE — TELEPHONE ENCOUNTER
Patient needs updated PHQ-9.  My chart message sent.   Olya Guzman RN - Triage  [facility]

## 2018-02-21 NOTE — TELEPHONE ENCOUNTER
"Requested Prescriptions   Pending Prescriptions Disp Refills     buPROPion (WELLBUTRIN XL) 300 MG 24 hr tablet [Pharmacy Med Name: BUPROPION HCL  MG TABLET]  Last Written Prescription Date:  11/24/17  Last Fill Quantity: 90 TABLET,  # refills: 0   Last office visit: 2/22/2017 with prescribing provider:  2/22/17   Future Office Visit:     90 tablet 0     Sig: TAKE 1 TABLET BY MOUTH EVERY MORNING    SSRIs Protocol Failed    2/21/2018  5:31 AM       Failed - PHQ-9 score less than 5 in past 6 months    The PHQ-9 criteria is meant to fail. It requires a PHQ-9 score review  PHQ-9 SCORE 2/1/2017 2/22/2017 9/1/2017   Total Score - - -   Total Score MyChart 5 (Mild depression) - 6 (Mild depression)   Total Score - 4 6     NICOLAS-7 SCORE 12/18/2014 6/15/2015 2/22/2017   Total Score 3 4 -   Total Score - - 5              Failed - Recent (6 mo) or future visit with authorizing provider's specialty    Patient had office visit in the last 6 months or has a visit in the next 30 days with authorizing provider.  See \"Patient Info\" tab in inbasket, or \"Choose Columns\" in Meds & Orders section of the refill encounter.           Passed - Medication is Bupropion    If the medication is Bupropion (Wellbutrin), and the patient is taking for smoking cessation; OK to refill.         Passed - Patient is age 18 or older       Passed - No active pregnancy on record       Passed - No positive pregnancy test in last 12 months          "

## 2018-02-22 RX ORDER — BUPROPION HYDROCHLORIDE 300 MG/1
TABLET ORAL
Qty: 90 TABLET | Refills: 0 | Status: SHIPPED | OUTPATIENT
Start: 2018-02-22 | End: 2018-03-15

## 2018-02-22 NOTE — TELEPHONE ENCOUNTER
PHQ-9 SCORE 2/22/2017 9/1/2017 2/21/2018   Total Score - - -   Total Score MyChart - 6 (Mild depression) 6 (Mild depression)   Total Score 4 6 6     Routing refill request to provider for review/approval because:  PHQ-9 > [facility] protocol for RN violet Guzman RN - Triage  [facility]

## 2018-02-24 ENCOUNTER — HEALTH MAINTENANCE LETTER (OUTPATIENT)
Age: 53
End: 2018-02-24

## 2018-03-15 ENCOUNTER — OFFICE VISIT (OUTPATIENT)
Dept: FAMILY MEDICINE | Facility: CLINIC | Age: 53
End: 2018-03-15
Payer: COMMERCIAL

## 2018-03-15 VITALS
WEIGHT: 162 LBS | HEART RATE: 80 BPM | HEIGHT: 65 IN | BODY MASS INDEX: 26.99 KG/M2 | DIASTOLIC BLOOD PRESSURE: 87 MMHG | TEMPERATURE: 98.2 F | SYSTOLIC BLOOD PRESSURE: 131 MMHG | OXYGEN SATURATION: 100 %

## 2018-03-15 DIAGNOSIS — F32.1 MODERATE SINGLE CURRENT EPISODE OF MAJOR DEPRESSIVE DISORDER (H): ICD-10-CM

## 2018-03-15 PROCEDURE — 99213 OFFICE O/P EST LOW 20 MIN: CPT | Performed by: INTERNAL MEDICINE

## 2018-03-15 RX ORDER — BUPROPION HYDROCHLORIDE 300 MG/1
300 TABLET ORAL EVERY MORNING
Qty: 90 TABLET | Refills: 1 | Status: SHIPPED | OUTPATIENT
Start: 2018-03-15 | End: 2018-08-28

## 2018-03-15 ASSESSMENT — ANXIETY QUESTIONNAIRES
GAD7 TOTAL SCORE: 5
6. BECOMING EASILY ANNOYED OR IRRITABLE: SEVERAL DAYS
IF YOU CHECKED OFF ANY PROBLEMS ON THIS QUESTIONNAIRE, HOW DIFFICULT HAVE THESE PROBLEMS MADE IT FOR YOU TO DO YOUR WORK, TAKE CARE OF THINGS AT HOME, OR GET ALONG WITH OTHER PEOPLE: SOMEWHAT DIFFICULT
1. FEELING NERVOUS, ANXIOUS, OR ON EDGE: SEVERAL DAYS
5. BEING SO RESTLESS THAT IT IS HARD TO SIT STILL: NOT AT ALL
7. FEELING AFRAID AS IF SOMETHING AWFUL MIGHT HAPPEN: NOT AT ALL
2. NOT BEING ABLE TO STOP OR CONTROL WORRYING: SEVERAL DAYS
3. WORRYING TOO MUCH ABOUT DIFFERENT THINGS: SEVERAL DAYS

## 2018-03-15 ASSESSMENT — PATIENT HEALTH QUESTIONNAIRE - PHQ9: 5. POOR APPETITE OR OVEREATING: SEVERAL DAYS

## 2018-03-15 NOTE — MR AVS SNAPSHOT
After Visit Summary   3/15/2018    Shanique Delgado    MRN: [id number]           Patient Information     Date Of Birth          1965        Visit Information        Provider Department      3/15/2018 11:00 AM [facility]        Today's Diagnoses     Moderate single current episode of major depressive disorder (H)          Care Instructions    Genesite testing:    [facility] at [facility]    [facility]              Follow-ups after your visit        Follow-up notes from your care team     Return in about 6 months (around 9/15/2018) for Mental health.      Who to contact     If you have questions or need follow up information about today's clinic visit or your schedule please contact [facility] [place] directly at [phone].  Normal or non-critical lab and imaging results will be communicated to you by MyChart, letter or phone within 4 business days after the clinic has received the results. If you do not hear from us within 7 days, please contact the clinic through MyChart or phone. If you have a critical or abnormal lab result, we will notify you by phone as soon as possible.  Submit refill requests through [employer] or call your pharmacy and they will forward the refill request to us. Please allow 3 business days for your refill to be completed.          Additional Information About Your Visit        MyChart Information     [employer] gives you secure access to your electronic health record. If you see a primary care provider, you can also send messages to your care team and make appointments. If you have questions, please call your primary care clinic.  If you do not have a primary care provider, please call [phone] and they will assist you.        Care EveryWhere ID     This is your Care EveryWhere ID. This could be used by other organizations to access your [facility] medical records  [id number]       "  Your Vitals Were     Pulse Temperature Height Pulse Oximetry BMI (Body Mass Index)       80 98.2  F (36.8  C) (Tympanic) 5' 5\" (1.651 m) 100% 26.96 kg/m2        Blood Pressure from Last 3 Encounters:   03/15/18 131/87   03/27/17 (!) 144/94   02/22/17 110/68    Weight from Last 3 Encounters:   03/15/18 162 lb (73.5 kg)   03/27/17 160 lb (72.6 kg)   02/22/17 162 lb 6.4 oz (73.7 kg)              Today, you had the following     No orders found for display         Today's Medication Changes          These changes are accurate as of 3/15/18 11:30 AM.  If you have any questions, ask your nurse or doctor.               These medicines have changed or have updated prescriptions.        Dose/Directions    buPROPion 300 MG 24 hr tablet   Commonly known as:  WELLBUTRIN XL   This may have changed:  See the new instructions.   Used for:  Moderate single current episode of major depressive disorder (H)   Changed by:  Ramandeep Salmeron MD        Dose:  300 mg   Take 1 tablet (300 mg) by mouth every morning   Quantity:  90 tablet   Refills:  1       FLUoxetine 20 MG capsule   Commonly known as:  PROzac   This may have changed:  See the new instructions.   Used for:  Moderate single current episode of major depressive disorder (H)   Changed by:  Ramandeep Salmeron MD        Dose:  60 mg   Take 3 capsules (60 mg) by mouth daily   Quantity:  270 capsule   Refills:  1            Where to get your medicines      These medications were sent to [place] [address] [place] [address] [address]     Phone:  [phone]     buPROPion 300 MG 24 hr tablet    FLUoxetine 20 MG capsule                Primary Care Provider Office Phone # Fax #    Jesse Davis PA-C [phone]       [address]  [place] [address]        Equal Access to Services     NICKO CABALLERO AH: Hadii alex nathan hadasho Soomaali, waaxda luqadaha, qaybta kaalmada adeegyada, kellie caseyin " fabián irizarrygrady laYanagary ah. So [facility] [phone].    ATENCIÓN: Si habla carson, tiene a lopez disposición servicios gratuitos de asistencia lingüística. Puja al [phone].    We comply with applicable federal civil rights laws and [place] laws. We do not discriminate on the basis of race, color, national origin, age, disability, sex, sexual orientation, or gender identity.            Thank you!     Thank you for choosing [facility] [place]  for your care. Our goal is always to provide you with excellent care. Hearing back from our patients is one way we can continue to improve our services. Please take a few minutes to complete the written survey that you may receive in the mail after your visit with us. Thank you!             Your Updated Medication List - Protect others around you: Learn how to safely use, store and throw away your medicines at www.disposemymeds.org.          This list is accurate as of 3/15/18 11:30 AM.  Always use your most recent med list.                   Brand Name Dispense Instructions for use Diagnosis    buPROPion 300 MG 24 hr tablet    WELLBUTRIN XL    90 tablet    Take 1 tablet (300 mg) by mouth every morning    Moderate single current episode of major depressive disorder (H)       FLUoxetine 20 MG capsule    PROzac    270 capsule    Take 3 capsules (60 mg) by mouth daily    Moderate single current episode of major depressive disorder (H)       MIRENA (52 MG) 20 MCG/24HR IUD   Generic drug:  levonorgestrel

## 2018-03-15 NOTE — NURSING NOTE
"Chief Complaint   Patient presents with     Recheck Medication       Initial /87  Pulse 80  Temp 98.2  F (36.8  C) (Tympanic)  Ht 5' 5\" (1.651 m)  Wt 162 lb (73.5 kg)  SpO2 100%  BMI 26.96 kg/m2 Estimated body mass index is 26.96 kg/(m^2) as calculated from the following:    Height as of this encounter: 5' 5\" (1.651 m).    Weight as of this encounter: 162 lb (73.5 kg).  Medication Reconciliation: complete  "

## 2018-03-15 NOTE — PROGRESS NOTES
"  SUBJECTIVE:   Shanique Delgado is a 52 year old female who presents to clinic today for the following health issues:        Depression and Anxiety Follow-Up    Status since last visit: No change    Other associated symptoms:None    Complicating factors:     Significant life event: No     Current substance abuse: None    Amount of exercise or physical activity: sometimes     Problems taking medications regularly: No    Medication side effects: none    Diet: regular (no restrictions)    PHQ-9 9/1/2017 2/21/2018 3/15/2018   Total Score 6 6 8   Q9: Suicide Ideation Not at all Not at all Not at all     NICOLAS-7 SCORE 6/15/2015 2/22/2017 3/15/2018   Total Score 4 - -   Total Score - 5 5       Shanique is here for med check of fluoxetine 40mg and bupropion 300mg daily.  She has always struggled with depression.  Feels things are okay right now, could be worse.  She is interested in Genesight testing since she's had trouble finding medications that work.     She and her  are taking their kids to [place] for spring break. 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          Reviewed and updated as needed this visit by clinical staff  Tobacco  Allergies  Meds       Reviewed and updated as needed this visit by Provider  Meds         ROS:  Psych reviewed,  otherwise negative unless noted above.       OBJECTIVE:     /87  Pulse 80  Temp 98.2  F (36.8  C) (Tympanic)  Ht 5' 5\" (1.651 m)  Wt 162 lb (73.5 kg)  SpO2 100%  BMI 26.96 kg/m2  Body mass index is 26.96 kg/(m^2).  GENERAL: healthy, alert and no distress  PSYCH: mentation appears normal, affect normal/bright    Diagnostic Test Results:  none     ASSESSMENT/PLAN:       1. Moderate single current episode of major depressive disorder (H)  Will start with increasing fluoxetine from 40mg to 60mg. Given places to go for genesight testing.   - buPROPion (WELLBUTRIN XL) 300 MG 24 hr tablet; Take 1 tablet (300 mg) by " mouth every morning  Dispense: 90 tablet; Refill: 1  - FLUoxetine (PROZAC) 20 MG capsule; Take 3 capsules (60 mg) by mouth daily  Dispense: 270 capsule; Refill: 1    Health maintenance -  Due for mammogram, she will make appt soon [facility] the [facility]  Thinks she had a pap smear last yeas, but can't recall where. Will look at home for records.     Follow up 6 months, sooner as needed.     Ramandeep Salmeron MD  [facility]

## 2018-03-16 ASSESSMENT — PATIENT HEALTH QUESTIONNAIRE - PHQ9: SUM OF ALL RESPONSES TO PHQ QUESTIONS 1-9: 8

## 2018-03-16 ASSESSMENT — ANXIETY QUESTIONNAIRES: GAD7 TOTAL SCORE: 5

## 2018-04-09 ENCOUNTER — E-VISIT (OUTPATIENT)
Dept: FAMILY MEDICINE | Facility: CLINIC | Age: 53
End: 2018-04-09
Payer: COMMERCIAL

## 2018-04-09 DIAGNOSIS — A09 TRAVELER'S DIARRHEA: Primary | ICD-10-CM

## 2018-04-09 PROCEDURE — 99444 ZZC PHYSICIAN ONLINE EVALUATION & MANAGEMENT SERVICE: CPT | Performed by: INTERNAL MEDICINE

## 2018-04-11 DIAGNOSIS — A09 TRAVELER'S DIARRHEA: ICD-10-CM

## 2018-04-11 LAB
BACTERIA SPEC CULT: NORMAL
BACTERIA SPEC CULT: NORMAL
E COLI SXT1+2 STL IA: NORMAL
E COLI SXT1+2 STL IA: NORMAL
SPECIMEN SOURCE: NORMAL

## 2018-04-11 PROCEDURE — 87506 IADNA-DNA/RNA PROBE TQ 6-11: CPT | Performed by: INTERNAL MEDICINE

## 2018-04-11 PROCEDURE — 87209 SMEAR COMPLEX STAIN: CPT | Performed by: INTERNAL MEDICINE

## 2018-04-11 PROCEDURE — 87177 OVA AND PARASITES SMEARS: CPT | Performed by: INTERNAL MEDICINE

## 2018-04-12 ENCOUNTER — MYC MEDICAL ADVICE (OUTPATIENT)
Dept: FAMILY MEDICINE | Facility: CLINIC | Age: 53
End: 2018-04-12

## 2018-04-12 DIAGNOSIS — A09 TRAVELER'S DIARRHEA: Primary | ICD-10-CM

## 2018-04-12 LAB
C COLI+JEJUNI+LARI FUSA STL QL NAA+PROBE: NOT DETECTED
EC STX1 GENE STL QL NAA+PROBE: NOT DETECTED
EC STX2 GENE STL QL NAA+PROBE: NOT DETECTED
ENTERIC PATHOGEN COMMENT: NORMAL
NOROV GI+II ORF1-ORF2 JNC STL QL NAA+PR: NOT DETECTED
O+P STL MICRO: NORMAL
O+P STL MICRO: NORMAL
RVA NSP5 STL QL NAA+PROBE: NOT DETECTED
SALMONELLA SP RPOD STL QL NAA+PROBE: NOT DETECTED
SHIGELLA SP+EIEC IPAH STL QL NAA+PROBE: NOT DETECTED
SPECIMEN SOURCE: NORMAL
V CHOL+PARA RFBL+TRKH+TNAA STL QL NAA+PR: NOT DETECTED
Y ENTERO RECN STL QL NAA+PROBE: NOT DETECTED

## 2018-04-12 RX ORDER — AZITHROMYCIN 500 MG/1
500 TABLET, FILM COATED ORAL DAILY
Qty: 3 TABLET | Refills: 0 | Status: SHIPPED | OUTPATIENT
Start: 2018-04-12 | End: 2018-04-15

## 2018-04-12 RX ORDER — ONDANSETRON 4 MG/1
4 TABLET, FILM COATED ORAL EVERY 8 HOURS PRN
Qty: 18 TABLET | Refills: 1 | Status: SHIPPED | OUTPATIENT
Start: 2018-04-12 | End: 2018-12-19

## 2018-04-12 NOTE — TELEPHONE ENCOUNTER
Please see My Chart message below and advise as appropriate.  Olya Guzman RN - Triage  [facility]

## 2018-08-28 ENCOUNTER — OFFICE VISIT (OUTPATIENT)
Dept: FAMILY MEDICINE | Facility: CLINIC | Age: 53
End: 2018-08-28
Payer: COMMERCIAL

## 2018-08-28 VITALS
HEART RATE: 79 BPM | DIASTOLIC BLOOD PRESSURE: 78 MMHG | WEIGHT: 151 LBS | BODY MASS INDEX: 25.13 KG/M2 | SYSTOLIC BLOOD PRESSURE: 122 MMHG | TEMPERATURE: 97.8 F

## 2018-08-28 DIAGNOSIS — Z12.31 VISIT FOR SCREENING MAMMOGRAM: ICD-10-CM

## 2018-08-28 DIAGNOSIS — F32.1 MODERATE SINGLE CURRENT EPISODE OF MAJOR DEPRESSIVE DISORDER (H): Primary | ICD-10-CM

## 2018-08-28 DIAGNOSIS — R41.840 ATTENTION AND CONCENTRATION DEFICIT: ICD-10-CM

## 2018-08-28 PROCEDURE — 99213 OFFICE O/P EST LOW 20 MIN: CPT | Performed by: INTERNAL MEDICINE

## 2018-08-28 RX ORDER — BUPROPION HYDROCHLORIDE 300 MG/1
300 TABLET ORAL EVERY MORNING
Qty: 90 TABLET | Refills: 1 | Status: SHIPPED | OUTPATIENT
Start: 2018-08-28 | End: 2019-04-26

## 2018-08-28 ASSESSMENT — PATIENT HEALTH QUESTIONNAIRE - PHQ9: 5. POOR APPETITE OR OVEREATING: SEVERAL DAYS

## 2018-08-28 ASSESSMENT — ANXIETY QUESTIONNAIRES
GAD7 TOTAL SCORE: 8
1. FEELING NERVOUS, ANXIOUS, OR ON EDGE: SEVERAL DAYS
7. FEELING AFRAID AS IF SOMETHING AWFUL MIGHT HAPPEN: NOT AT ALL
3. WORRYING TOO MUCH ABOUT DIFFERENT THINGS: SEVERAL DAYS
5. BEING SO RESTLESS THAT IT IS HARD TO SIT STILL: MORE THAN HALF THE DAYS
6. BECOMING EASILY ANNOYED OR IRRITABLE: MORE THAN HALF THE DAYS
2. NOT BEING ABLE TO STOP OR CONTROL WORRYING: SEVERAL DAYS
IF YOU CHECKED OFF ANY PROBLEMS ON THIS QUESTIONNAIRE, HOW DIFFICULT HAVE THESE PROBLEMS MADE IT FOR YOU TO DO YOUR WORK, TAKE CARE OF THINGS AT HOME, OR GET ALONG WITH OTHER PEOPLE: SOMEWHAT DIFFICULT

## 2018-08-28 NOTE — MR AVS SNAPSHOT
After Visit Summary   8/28/2018    Shanique Delgado    MRN: [id number]           Patient Information     Date Of Birth          1965        Visit Information        Provider Department      8/28/2018 10:00 AM Ramandeep Salmeron MD [facility] [place]        Today's Diagnoses     Attention and concentration deficit    -  1    Moderate single current episode of major depressive disorder (H)        Visit for screening mammogram          Care Instructions    Mary Call - at [facility] for Genesight testing.           Follow-ups after your visit        Additional Services     MENTAL HEALTH REFERRAL  - Adult; Assessments and Testing; ADHD; FMG: [facility] [phone]; We will contact you to schedule the appointment or please call with any questions       All scheduling is subject to the client's specific insurance plan & benefits, provider/location availability, and provider clinical specialities.  Please arrive 15 minutes early for your first appointment and bring your completed paperwork.    Please be aware that coverage of these services is subject to the terms and limitations of your health insurance plan.  Call member services at your health plan with any benefit or coverage questions.                            Future tests that were ordered for you today     Open Future Orders        Priority Expected Expires Ordered    *MA Screening Digital Bilateral Routine  8/28/2019 8/28/2018            Who to contact     If you have questions or need follow up information about today's clinic visit or your schedule please contact [facility] [place] directly at [phone].  Normal or non-critical lab and imaging results will be communicated to you by MyChart, letter or phone within 4 business days after the clinic has received the results. If you do not hear from us within 7 days, please contact the clinic through MyChart or phone. If you have a critical or  abnormal lab result, we will notify you by phone as soon as possible.  Submit refill requests through [employer] or call your pharmacy and they will forward the refill request to us. Please allow 3 business days for your refill to be completed.          Additional Information About Your Visit        [employer] Information     [employer] gives you secure access to your electronic health record. If you see a primary care provider, you can also send messages to your care team and make appointments. If you have questions, please call your primary care clinic.  If you do not have a primary care provider, please call [phone] and they will assist you.        Care EveryWhere ID     This is your Care EveryWhere ID. This could be used by other organizations to access your [place] medical records  [id number]        Your Vitals Were     Pulse Temperature BMI (Body Mass Index)             79 97.8  F (36.6  C) (Tympanic) 25.13 kg/m2          Blood Pressure from Last 3 Encounters:   08/28/18 122/78   03/15/18 131/87   03/27/17 (!) 144/94    Weight from Last 3 Encounters:   08/28/18 151 lb (68.5 kg)   03/15/18 162 lb (73.5 kg)   03/27/17 160 lb (72.6 kg)              We Performed the Following     MENTAL HEALTH REFERRAL  - Adult; Assessments and Testing; ADHD; FMG: [facility] [phone]; We will contact you to schedule the appointment or please call with any questions          Where to get your medicines      These medications were sent to [place] [address] [place] [facility] [place] - [address] [address]     Phone:  [phone]     buPROPion 300 MG 24 hr tablet    FLUoxetine 20 MG capsule          Primary Care Provider Office Phone # Fax #    Jesse Davis PA-C [phone]       [address]  [place] [address]        Equal Access to Services     NICKO CABALLERO AH: Hadii aad venita Hess, waaxda ludevikaadaricki, qaybta santiagoalmarenetta  kellie snownenosia nicolasYanaaaburke ah. [facility] [phone].    ATENCIÓN: Si habla carson, tiene a lopez disposición servicios gratuitos de asistencia lingüística. Puja al [phone].    We comply with applicable federal civil rights laws and [place] laws. We do not discriminate on the basis of race, color, national origin, age, disability, sex, sexual orientation, or gender identity.            Thank you!     Thank you for choosing [facility] [place]  for your care. Our goal is always to provide you with excellent care. Hearing back from our patients is one way we can continue to improve our services. Please take a few minutes to complete the written survey that you may receive in the mail after your visit with us. Thank you!             Your Updated Medication List - Protect others around you: Learn how to safely use, store and throw away your medicines at www.disposemymeds.org.          This list is accurate as of 8/28/18 10:15 AM.  Always use your most recent med list.                   Brand Name Dispense Instructions for use Diagnosis    buPROPion 300 MG 24 hr tablet    WELLBUTRIN XL    90 tablet    Take 1 tablet (300 mg) by mouth every morning    Moderate single current episode of major depressive disorder (H)       FLUoxetine 20 MG capsule    PROzac    270 capsule    Take 3 capsules (60 mg) by mouth daily    Moderate single current episode of major depressive disorder (H)       MIRENA (52 MG) 20 MCG/24HR IUD   Generic drug:  levonorgestrel           ondansetron 4 MG tablet    ZOFRAN    18 tablet    Take 1 tablet (4 mg) by mouth every 8 hours as needed for nausea    Traveler's diarrhea

## 2018-08-28 NOTE — PROGRESS NOTES
SUBJECTIVE:   Shanique Delgado is a 52 year old female who presents to clinic today for the following health issues:      Medication Followup of Fluoexetine & wellbutrin     Taking Medication as prescribed: tried the 60 mg prozac, didn't see a difference, takes 40 mg     Side Effects:  None    Medication Helping Symptoms:  Yes but has questions      Shanique is here for follow-up of depression.  On her last visit, we discussed increasing her fluoxetine to 60 mg as her symptoms are not very well controlled.  She did this for a little while but did not notice any improvement so is now taking 40 mg daily.  She did not end up getting the gene site testing done as she was concerned about the cost.  She is wondering whether ADHD might be playing a role in her symptoms.  Her older son has ADHD, and she was reviewing some of the symptoms and notices she has quite a few of them.  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     Her older son is at [employer].  Younger son they just dropped off for his freshman year at [employer].       PHQ-9 SCORE 2/21/2018 3/15/2018 8/28/2018   Total Score - - -   Total Score MyChart 6 (Mild depression) - -   Total Score 6 8 13       NICOLAS-7 SCORE 2/22/2017 3/15/2018 8/28/2018   Total Score - - -   Total Score 5 5 8           Reviewed and updated as needed this visit by clinical staff  Tobacco  Allergies  Meds       Reviewed and updated as needed this visit by Provider         ROS:  Psych reviewed,  otherwise negative unless noted above.       OBJECTIVE:     /78 (BP Location: Right arm, Patient Position: Chair, Cuff Size: Adult Regular)  Pulse 79  Temp 97.8  F (36.6  C) (Tympanic)  Wt 151 lb (68.5 kg)  BMI 25.13 kg/m2  Body mass index is 25.13 kg/(m^2).    GENERAL: healthy, alert and no distress  PSYCH: mentation appears normal and affect somewhat flat      ASSESSMENT/PLAN:       1. Moderate single current episode of major depressive disorder (H)  PHQ9  score is high, but overall she feels her mood is okay.  We again talked about trying a different medication, but she is anxious about trying something which may make her feel worse.  Discussed Genesight testing again.    - buPROPion (WELLBUTRIN XL) 300 MG 24 hr tablet; Take 1 tablet (300 mg) by mouth every morning  Dispense: 90 tablet; Refill: 1  - FLUoxetine (PROZAC) 20 MG capsule; Take 3 capsules (60 mg) by mouth daily  Dispense: 270 capsule; Refill: 1    2. Attention and concentration deficit  Given her age and comorbid anxiety and depression, will have her see psychology for formal testing.   - MENTAL HEALTH REFERRAL  - Adult; Assessments and Testing; ADHD; [facility]: [facility] [phone]; We will contact you to schedule the appointment or please call with any questions    3. Visit for screening mammogram  - *MA Screening Digital Bilateral; Future    F/U - 6 months for physical.     Ramandeep Salmeron MD  [facility]

## 2018-08-29 ASSESSMENT — PATIENT HEALTH QUESTIONNAIRE - PHQ9: SUM OF ALL RESPONSES TO PHQ QUESTIONS 1-9: 13

## 2018-08-29 ASSESSMENT — ANXIETY QUESTIONNAIRES: GAD7 TOTAL SCORE: 8

## 2018-10-27 DIAGNOSIS — F32.1 MODERATE SINGLE CURRENT EPISODE OF MAJOR DEPRESSIVE DISORDER (H): ICD-10-CM

## 2018-10-29 NOTE — TELEPHONE ENCOUNTER
Rx denied to pharmacy since a new rx was sent on 8/28/18 for 6 months.    Gianna BOLIVAR RN  EP Triage

## 2018-10-29 NOTE — TELEPHONE ENCOUNTER
"Requested Prescriptions   Pending Prescriptions Disp Refills     FLUoxetine (PROZAC) 20 MG capsule [Pharmacy Med Name: FLUOXETINE HCL 20 MG CAPSULE]  Last Written Prescription Date:  8/28/18  Last Fill Quantity: 270,  # refills: 1   Last office visit: 8/28/2018 with prescribing provider:  Jaron   Future Office Visit:     270 capsule 1     Sig: TAKE 3 CAPSULES (60 MG) BY MOUTH DAILY    SSRIs Protocol Failed    10/27/2018  3:25 AM       Failed - PHQ-9 score less than 5 in past 6 months    Please review last PHQ-9 score.   PHQ-9 SCORE 2/21/2018 3/15/2018 8/28/2018   Total Score - - -   Total Score MyChart 6 (Mild depression) - -   Total Score 6 8 13              Passed - Patient is age 18 or older       Passed - No active pregnancy on record       Passed - No positive pregnancy test in last 12 months       Passed - Recent (6 mo) or future (30 days) visit within the authorizing provider's specialty    Patient had office visit in the last 6 months or has a visit in the next 30 days with authorizing provider or within the authorizing provider's specialty.  See \"Patient Info\" tab in inbasket, or \"Choose Columns\" in Meds & Orders section of the refill encounter.              "

## 2018-11-06 DIAGNOSIS — F32.1 MODERATE SINGLE CURRENT EPISODE OF MAJOR DEPRESSIVE DISORDER (H): ICD-10-CM

## 2018-11-06 NOTE — TELEPHONE ENCOUNTER
"Requested Prescriptions   Pending Prescriptions Disp Refills     FLUoxetine (PROZAC) 20 MG capsule  Last Written Prescription Date:  8/28/18  Last Fill Quantity: 270,  # refills: 1   Last office visit: 8/28/2018 with prescribing provider:  Jaron   Future Office Visit:     270 capsule 1     Sig: Take 3 capsules (60 mg) by mouth daily    SSRIs Protocol Failed    11/6/2018  8:13 AM       Failed - PHQ-9 score less than 5 in past 6 months    Please review last PHQ-9 score.   PHQ-9 SCORE 2/21/2018 3/15/2018 8/28/2018   Total Score - - -   Total Score MyChart 6 (Mild depression) - -   Total Score 6 8 13              Passed - Patient is age 18 or older       Passed - No active pregnancy on record       Passed - No positive pregnancy test in last 12 months       Passed - Recent (6 mo) or future (30 days) visit within the authorizing provider's specialty    Patient had office visit in the last 6 months or has a visit in the next 30 days with authorizing provider or within the authorizing provider's specialty.  See \"Patient Info\" tab in inbasket, or \"Choose Columns\" in Meds & Orders section of the refill encounter.              "

## 2018-11-19 ENCOUNTER — TRANSFERRED RECORDS (OUTPATIENT)
Dept: HEALTH INFORMATION MANAGEMENT | Facility: CLINIC | Age: 53
End: 2018-11-19

## 2018-11-29 DIAGNOSIS — F32.1 MODERATE SINGLE CURRENT EPISODE OF MAJOR DEPRESSIVE DISORDER (H): ICD-10-CM

## 2018-11-29 RX ORDER — BUPROPION HYDROCHLORIDE 300 MG/1
TABLET ORAL
Qty: 90 TABLET | Refills: 1 | OUTPATIENT
Start: 2018-11-29

## 2018-12-18 NOTE — PROGRESS NOTES
SUBJECTIVE:   Shanique Delgado is a 53 year old female who presents to clinic today for the following health issues:      Pt is here for a follow up on her ADHD diagnosis.    She saw a psychologist at [facility] and met criteria for ADHD and depression.  She is wondering whether a stimulant might help in addition to the bupropion and fluoxetine she is already taking. Her daughter takes Focalin, can't recall what her son takes. She finds herself uncharacteristically optimistic that if her attention issues are better, her anxiety and depression might be better too.       Reviewed and updated as needed this visit by clinical staff  Tobacco  Allergies  Meds  Med Hx  Surg Hx  Fam Hx  Soc Hx      Reviewed and updated as needed this visit by Provider         ROS:  Psych reviewed,  otherwise negative unless noted above.       OBJECTIVE:     /70   Pulse 82   Temp 96.4  F (35.8  C)   Wt 71.6 kg (157 lb 12.8 oz)   BMI 26.26 kg/m    Body mass index is 26.26 kg/m .  GENERAL: healthy, alert and no distress  PSYCH: mentation appears normal, affect normal/bright        ASSESSMENT/PLAN:       1. Attention deficit hyperactivity disorder (ADHD), predominantly inattentive type  Will start with low dose of Focalin, since her daughter does well on this. Reviewed common side effects.   - dexmethylphenidate (FOCALIN XR) 10 MG 24 hr capsule; Take 1 capsule (10 mg) by mouth daily  Dispense: 30 capsule; Refill: 0    2. Need for prophylactic vaccination and inoculation against influenza  - Vaccine Administration, Initial [97594]    F/U - 1 month    Ramandeep Salmeron MD  [facility]

## 2018-12-19 ENCOUNTER — OFFICE VISIT (OUTPATIENT)
Dept: FAMILY MEDICINE | Facility: CLINIC | Age: 53
End: 2018-12-19
Payer: COMMERCIAL

## 2018-12-19 VITALS
SYSTOLIC BLOOD PRESSURE: 124 MMHG | TEMPERATURE: 96.4 F | BODY MASS INDEX: 26.26 KG/M2 | HEART RATE: 82 BPM | WEIGHT: 157.8 LBS | DIASTOLIC BLOOD PRESSURE: 70 MMHG

## 2018-12-19 DIAGNOSIS — Z23 NEED FOR PROPHYLACTIC VACCINATION AND INOCULATION AGAINST INFLUENZA: ICD-10-CM

## 2018-12-19 DIAGNOSIS — F90.0 ATTENTION DEFICIT HYPERACTIVITY DISORDER (ADHD), PREDOMINANTLY INATTENTIVE TYPE: Primary | ICD-10-CM

## 2018-12-19 PROCEDURE — 90471 IMMUNIZATION ADMIN: CPT | Performed by: INTERNAL MEDICINE

## 2018-12-19 PROCEDURE — 90682 RIV4 VACC RECOMBINANT DNA IM: CPT | Performed by: INTERNAL MEDICINE

## 2018-12-19 PROCEDURE — 99213 OFFICE O/P EST LOW 20 MIN: CPT | Mod: 25 | Performed by: INTERNAL MEDICINE

## 2018-12-19 RX ORDER — DEXMETHYLPHENIDATE HYDROCHLORIDE 10 MG/1
10 CAPSULE, EXTENDED RELEASE ORAL DAILY
Qty: 30 CAPSULE | Refills: 0 | Status: SHIPPED | OUTPATIENT
Start: 2018-12-19 | End: 2019-02-18

## 2018-12-19 ASSESSMENT — ANXIETY QUESTIONNAIRES
2. NOT BEING ABLE TO STOP OR CONTROL WORRYING: SEVERAL DAYS
6. BECOMING EASILY ANNOYED OR IRRITABLE: SEVERAL DAYS
1. FEELING NERVOUS, ANXIOUS, OR ON EDGE: SEVERAL DAYS
5. BEING SO RESTLESS THAT IT IS HARD TO SIT STILL: NOT AT ALL
7. FEELING AFRAID AS IF SOMETHING AWFUL MIGHT HAPPEN: NOT AT ALL
GAD7 TOTAL SCORE: 5
IF YOU CHECKED OFF ANY PROBLEMS ON THIS QUESTIONNAIRE, HOW DIFFICULT HAVE THESE PROBLEMS MADE IT FOR YOU TO DO YOUR WORK, TAKE CARE OF THINGS AT HOME, OR GET ALONG WITH OTHER PEOPLE: SOMEWHAT DIFFICULT
3. WORRYING TOO MUCH ABOUT DIFFERENT THINGS: SEVERAL DAYS

## 2018-12-19 ASSESSMENT — PATIENT HEALTH QUESTIONNAIRE - PHQ9
SUM OF ALL RESPONSES TO PHQ QUESTIONS 1-9: 8
5. POOR APPETITE OR OVEREATING: SEVERAL DAYS

## 2018-12-20 ASSESSMENT — ANXIETY QUESTIONNAIRES: GAD7 TOTAL SCORE: 5

## 2019-01-08 ENCOUNTER — TELEPHONE (OUTPATIENT)
Dept: FAMILY MEDICINE | Facility: CLINIC | Age: 54
End: 2019-01-08

## 2019-01-08 NOTE — TELEPHONE ENCOUNTER
Pt is due now to update PHQ9.  max message sent to pt. Follow up end date 4/28/19.   PHQ-9 SCORE 3/15/2018 8/28/2018 12/19/2018   PHQ-9 Total Score - - -   PHQ-9 Total Score MyChart - - -   PHQ-9 Total Score 8 13 8     Hayder SARGENT CMA

## 2019-01-16 NOTE — PROGRESS NOTES
"  SUBJECTIVE:   Shanique Delgado is a 53 year old female who presents to clinic today for the following health issues:      Medication Followup of Focalin    Taking Medication as prescribed: yes    Side Effects:  Trouble sleeping    Medication Helping Symptoms:  yes     Last month we started Focalin XR 10mg for newly diagnosed ADHD.  She is feeling great on this medication.  It helps her focus and her mood is much better.  She takes it between 8 and 9 in the morning, and still feels the effects at 9pm.  So she is having some trouble falling asleep - wondering if there is a shorter acting version.  Hasn't noticed any other side effects. Their family is on the keto diet right now, so that has curbed her appetite also.      PHQ-9 SCORE 8/28/2018 12/19/2018 1/17/2019   PHQ-9 Total Score - - -   PHQ-9 Total Score MyChart - - -   PHQ-9 Total Score 13 8 5           Reviewed and updated as needed this visit by clinical staff       Reviewed and updated as needed this visit by Provider         ROS:  CV, psych, GI reviewed,  otherwise negative unless noted above.       OBJECTIVE:     /74   Pulse 98   Temp 96.2  F (35.7  C)   Ht 1.651 m (5' 5\")   Wt 69.4 kg (153 lb)   BMI 25.46 kg/m    Body mass index is 25.46 kg/m .  GENERAL: healthy, alert and no distress  PSYCH: mentation appears normal, affect normal/bright        ASSESSMENT/PLAN:       1. Attention deficit hyperactivity disorder (ADHD), predominantly inattentive type  Will try immediate release focalin just once daily.  Glad her PHQ9 score is improved as well!  - dexmethylphenidate (FOCALIN) 10 MG tablet; Take 1 tablet (10 mg) by mouth daily  Dispense: 30 tablet; Refill: 0    2. Moderate single current episode of major depressive disorder (H)  As above     3. Controlled substance agreement signed        F/U 1 month OV or evisit for ADHD       Ramandeep Salmeron MD  [facility]  "

## 2019-01-17 ENCOUNTER — OFFICE VISIT (OUTPATIENT)
Dept: FAMILY MEDICINE | Facility: CLINIC | Age: 54
End: 2019-01-17
Payer: COMMERCIAL

## 2019-01-17 VITALS
SYSTOLIC BLOOD PRESSURE: 126 MMHG | WEIGHT: 153 LBS | HEIGHT: 65 IN | DIASTOLIC BLOOD PRESSURE: 74 MMHG | HEART RATE: 88 BPM | TEMPERATURE: 96.2 F | BODY MASS INDEX: 25.49 KG/M2

## 2019-01-17 DIAGNOSIS — Z79.899 CONTROLLED SUBSTANCE AGREEMENT SIGNED: ICD-10-CM

## 2019-01-17 DIAGNOSIS — F32.1 MODERATE SINGLE CURRENT EPISODE OF MAJOR DEPRESSIVE DISORDER (H): ICD-10-CM

## 2019-01-17 DIAGNOSIS — F90.0 ATTENTION DEFICIT HYPERACTIVITY DISORDER (ADHD), PREDOMINANTLY INATTENTIVE TYPE: Primary | ICD-10-CM

## 2019-01-17 PROCEDURE — 99213 OFFICE O/P EST LOW 20 MIN: CPT | Performed by: INTERNAL MEDICINE

## 2019-01-17 RX ORDER — DEXMETHYLPHENIDATE HYDROCHLORIDE 10 MG/1
10 TABLET ORAL DAILY
Qty: 30 TABLET | Refills: 0 | Status: SHIPPED | OUTPATIENT
Start: 2019-01-17 | End: 2019-02-16

## 2019-01-17 ASSESSMENT — ANXIETY QUESTIONNAIRES
5. BEING SO RESTLESS THAT IT IS HARD TO SIT STILL: SEVERAL DAYS
2. NOT BEING ABLE TO STOP OR CONTROL WORRYING: NOT AT ALL
6. BECOMING EASILY ANNOYED OR IRRITABLE: SEVERAL DAYS
GAD7 TOTAL SCORE: 6
3. WORRYING TOO MUCH ABOUT DIFFERENT THINGS: SEVERAL DAYS
1. FEELING NERVOUS, ANXIOUS, OR ON EDGE: SEVERAL DAYS
7. FEELING AFRAID AS IF SOMETHING AWFUL MIGHT HAPPEN: NOT AT ALL

## 2019-01-17 ASSESSMENT — MIFFLIN-ST. JEOR: SCORE: 1304.88

## 2019-01-17 ASSESSMENT — PATIENT HEALTH QUESTIONNAIRE - PHQ9
5. POOR APPETITE OR OVEREATING: MORE THAN HALF THE DAYS
SUM OF ALL RESPONSES TO PHQ QUESTIONS 1-9: 5

## 2019-01-17 NOTE — LETTER
[facility] [place]  01/17/19    Patient: Shanique Delgado  YOB: 1965  Medical Record Number: [id number]  CSN: [id number]                                                                              Non-opioid Controlled Substance Agreement    I understand that my care provider has prescribed a controlled substance to help manage my condition(s). I am taking this medicine to help me function or work. I know this is strong medicine, and that it can cause serious side effects. Controlled substances can be sedating, addicting and may cause a dependency on the drug. They can affect my ability to drive or think, and cause depression. They need to be taken exactly as prescribed. Combining controlled substances with certain medicines or chemicals (such as cocaine, sedatives and tranquilizers, sleeping pills, meth) can be dangerous or even fatal. Also, if I stop controlled substances suddenly, I may have severe withdrawal symptoms.  If not helpful, I may be asked to stop them.    The risks, benefits, and side effects of these medicine(s) were explained to me. I agree that:    1. I will take part in other treatments as advised by my care team. This may be psychiatry or counseling, physical therapy, behavioral therapy, group treatment or a referral to a pain clinic. I will reduce or stop my medicine when my care team tells me to do so.  2. I will take my medicines as prescribed. I will not change the dose or schedule unless my care team tells me to. There will be no refills if I  run out early.   I may be contactedwithout warning and asked to complete a urine drug test or pill count at any time.   3. I will keep all my appointments, and understand this is part of the monitoring of controlled substances. My care team may require an office visit for EVERY controlled substance refill. If I miss appointments or don t follow instructions, my care team may stop my medicine.  4. I will not ask other providers  to prescribe controlled substances, and I will not accept controlled substances from other people. If I need another prescribed controlled substance for a new reason, I will tell my care team within 1 business day.  5. I will use one pharmacy to fill all of my controlled substance prescriptions, and it is up to me to make sure that I do not run out of my medicines on weekends or holidays. If my care team is willing to refill my controlled substance prescription without a visit, I must request refills only during office hours, refills may take up to 3 days to process, and it may take up to 5 to 7 days for my medicine to be mailed and ready at my pharmacy. Prescriptions will not be mailed anywhere except my pharmacy.    6. I am responsible for my prescriptions. If the medicine/prescription is lost or stolen, it will not be replaced. I also agree not to share controlled substance medicines with anyone.              [facility]  [place]17[place]  Patient:  Shanique Delgado  YOB: 1965  Medical Record Number: [id number]  CSN: [id number]    7. I agree to not use ANY illegal or recreational drugs. This includes marijuana, cocaine, bath salts or other drugs. I agree not to use alcohol unless my care team says I may. I agree to give urine samples whenever asked. If I don t give a urine sample, the care team may stop my medicine.    8. If I enroll in the [place] Medical Marijuana program, I will tell my care team. I will also sign an agreement to share my medical records with my care team.    9. I will bring in my list of medicines (or my medicine bottles) each time I come to the clinic.   10. I will tell my care team right away if I become pregnant or have a new medical problem treated outside of my regular clinic.  11. I understand that this medicine can affect my thinking and judgment. It may be unsafe for me to drive, use machinery and do dangerous tasks. I will not do any of these things until I  know how the medicine affects me. If my dose changes, I will wait to see how it affects me. I will contact my care team if I have concerns about medicine side effects.    I understand that if I do not follow any of the conditions above, my prescriptions or treatment may be stopped.      I agree that my provider, clinic care team, and pharmacy may work with any city, state or federal law enforcement agency that investigates the misuse, sale, or other diversion of my controlled medicine. I will allow my provider to discuss my care with or share a copy of this agreement with any other treating provider, pharmacy or emergency room where I receive care. I agree to give up (waive) any right of privacy or confidentiality with respect to these consents.   I have read this agreement and have asked questions about anything I did not understand.    ____________________________________________________    ____________  ________  Patient signature                                                         Date      Time    ____________________________________________________     ____________  ________  Witness                                                          Date      Time    ____________________________________________________  Provider signature

## 2019-01-18 ASSESSMENT — ANXIETY QUESTIONNAIRES: GAD7 TOTAL SCORE: 6

## 2019-02-17 ENCOUNTER — E-VISIT (OUTPATIENT)
Dept: FAMILY MEDICINE | Facility: CLINIC | Age: 54
End: 2019-02-17
Payer: COMMERCIAL

## 2019-02-17 DIAGNOSIS — F90.0 ATTENTION DEFICIT HYPERACTIVITY DISORDER (ADHD), PREDOMINANTLY INATTENTIVE TYPE: ICD-10-CM

## 2019-02-18 RX ORDER — DEXMETHYLPHENIDATE HYDROCHLORIDE 10 MG/1
10 CAPSULE, EXTENDED RELEASE ORAL DAILY
Qty: 30 CAPSULE | Refills: 0 | Status: SHIPPED | OUTPATIENT
Start: 2019-02-18 | End: 2019-02-18

## 2019-02-18 RX ORDER — DEXMETHYLPHENIDATE HYDROCHLORIDE 10 MG/1
10 CAPSULE, EXTENDED RELEASE ORAL DAILY
Qty: 30 CAPSULE | Refills: 0 | Status: SHIPPED | OUTPATIENT
Start: 2019-04-18 | End: 2019-05-22

## 2019-02-18 RX ORDER — DEXMETHYLPHENIDATE HYDROCHLORIDE 10 MG/1
10 CAPSULE, EXTENDED RELEASE ORAL DAILY
Qty: 30 CAPSULE | Refills: 0 | Status: SHIPPED | OUTPATIENT
Start: 2019-03-18 | End: 2019-02-18

## 2019-02-18 NOTE — TELEPHONE ENCOUNTER
RX for Focalin XR (3 hard scripts) signed, patient notified via telephone call. Prescriptions brought to the  for patient to .      .Kathryn EDUARDO    [facility] [place]

## 2019-04-26 ENCOUNTER — MYC REFILL (OUTPATIENT)
Dept: FAMILY MEDICINE | Facility: CLINIC | Age: 54
End: 2019-04-26

## 2019-04-26 DIAGNOSIS — F90.0 ATTENTION DEFICIT HYPERACTIVITY DISORDER (ADHD), PREDOMINANTLY INATTENTIVE TYPE: ICD-10-CM

## 2019-04-26 DIAGNOSIS — F32.1 MODERATE SINGLE CURRENT EPISODE OF MAJOR DEPRESSIVE DISORDER (H): ICD-10-CM

## 2019-04-26 RX ORDER — DEXMETHYLPHENIDATE HYDROCHLORIDE 10 MG/1
10 CAPSULE, EXTENDED RELEASE ORAL DAILY
Qty: 30 CAPSULE | Refills: 0 | Status: CANCELLED | OUTPATIENT
Start: 2019-04-26

## 2019-04-29 RX ORDER — BUPROPION HYDROCHLORIDE 300 MG/1
300 TABLET ORAL EVERY MORNING
Qty: 90 TABLET | Refills: 1 | Status: SHIPPED | OUTPATIENT
Start: 2019-04-29 | End: 2019-10-10

## 2019-04-29 NOTE — TELEPHONE ENCOUNTER
"    Requested Prescriptions   Pending Prescriptions Disp Refills     buPROPion (WELLBUTRIN XL) 300 MG 24 hr tablet 90 tablet 1     Sig: Take 1 tablet (300 mg) by mouth every morning     PHQ-9 SCORE 8/28/2018 12/19/2018 1/17/2019   PHQ-9 Total Score - - -   PHQ-9 Total Score MyChart - - -   PHQ-9 Total Score 13 8 5   Last Written Prescription Date:  08/28/18  Last Fill Quantity: 90,  # refills: 1   Last office visit: 1/17/2019 with prescribing provider:     Future Office Visit:        SSRIs Protocol Failed - 4/26/2019  8:58 AM        Failed - PHQ-9 score less than 5 in past 6 months     Please review last PHQ-9 score.           Passed - Medication is Bupropion     If the medication is Bupropion (Wellbutrin), and the patient is taking for smoking cessation; OK to refill.          Passed - Medication is active on med list        Passed - Patient is age 18 or older        Passed - No active pregnancy on record        Passed - No positive pregnancy test in last 12 months        Passed - Recent (6 mo) or future (30 days) visit within the authorizing provider's specialty     Patient had office visit in the last 6 months or has a visit in the next 30 days with authorizing provider or within the authorizing provider's specialty.  See \"Patient Info\" tab in inbasket, or \"Choose Columns\" in Meds & Orders section of the refill encounter.            FLUoxetine (PROZAC) 20 MG capsule  Last Written Prescription Date:  08/28/18  Last Fill Quantity: 270,  # refills: 1   Last office visit: 1/17/2019 with prescribing provider:     Future Office Visit:     270 capsule 1     Sig: Take 3 capsules (60 mg) by mouth daily       SSRIs Protocol Failed - 4/26/2019  8:58 AM        Failed - PHQ-9 score less than 5 in past 6 months     Please review last PHQ-9 score.           Passed - Medication is Bupropion     If the medication is Bupropion (Wellbutrin), and the patient is taking for smoking cessation; OK to refill.          Passed - Medication " "is active on med list        Passed - Patient is age 18 or older        Passed - No active pregnancy on record        Passed - No positive pregnancy test in last 12 months        Passed - Recent (6 mo) or future (30 days) visit within the authorizing provider's specialty     Patient had office visit in the last 6 months or has a visit in the next 30 days with authorizing provider or within the authorizing provider's specialty.  See \"Patient Info\" tab in inbasket, or \"Choose Columns\" in Meds & Orders section of the refill encounter.            dexmethylphenidate (FOCALIN XR) 10 MG 24 hr capsule  Last Written Prescription Date:  04/18/19  Last Fill Quantity: 30,  # refills: 0   Last office visit: 1/17/2019 with prescribing provider:     Future Office Visit:  '   30 capsule 0     Sig: Take 1 capsule (10 mg) by mouth daily       There is no refill protocol information for this order          "

## 2019-05-22 ENCOUNTER — MYC REFILL (OUTPATIENT)
Dept: FAMILY MEDICINE | Facility: CLINIC | Age: 54
End: 2019-05-22

## 2019-05-22 DIAGNOSIS — F90.0 ATTENTION DEFICIT HYPERACTIVITY DISORDER (ADHD), PREDOMINANTLY INATTENTIVE TYPE: ICD-10-CM

## 2019-05-24 ENCOUNTER — MYC REFILL (OUTPATIENT)
Dept: FAMILY MEDICINE | Facility: CLINIC | Age: 54
End: 2019-05-24

## 2019-05-24 DIAGNOSIS — F90.0 ATTENTION DEFICIT HYPERACTIVITY DISORDER (ADHD), PREDOMINANTLY INATTENTIVE TYPE: ICD-10-CM

## 2019-05-24 NOTE — TELEPHONE ENCOUNTER
Requested Prescriptions   Pending Prescriptions Disp Refills     dexmethylphenidate (FOCALIN XR) 10 MG 24 hr capsule 30 capsule 0     Sig: Take 1 capsule (10 mg) by mouth daily       There is no refill protocol information for this order        dexmethylphenidate (FOCALIN XR) 10 MG 24 hr capsule 30 capsule 0 4/18/2019  No   Sig - Route: Take 1 capsule (10 mg) by mouth daily - Oral   Class: Local Print   Earliest Fill Date: 4/18/2019       Last office visit: 1/17/2019   Future Office Visit:        Routing refill request to provider for review/approval because:    Drug not on the [facility] refill protocol     Jennifer Rodarte RN, BSN  [facility]

## 2019-05-25 NOTE — TELEPHONE ENCOUNTER
Last Written Prescription Date:  04/18/19  Last Fill Quantity: 30,  # refills: 0   Last office visit: 1/17/2019 with prescribing provider:     Future Office Visit:    Requested Prescriptions   Pending Prescriptions Disp Refills     dexmethylphenidate (FOCALIN XR) 10 MG 24 hr capsule 30 capsule 0     Sig: Take 1 capsule (10 mg) by mouth daily       There is no refill protocol information for this order

## 2019-05-28 RX ORDER — DEXMETHYLPHENIDATE HYDROCHLORIDE 10 MG/1
10 CAPSULE, EXTENDED RELEASE ORAL DAILY
Qty: 30 CAPSULE | Refills: 0 | Status: SHIPPED | OUTPATIENT
Start: 2019-05-28 | End: 2019-06-25

## 2019-05-28 RX ORDER — DEXMETHYLPHENIDATE HYDROCHLORIDE 10 MG/1
10 CAPSULE, EXTENDED RELEASE ORAL DAILY
Qty: 30 CAPSULE | Refills: 0 | OUTPATIENT
Start: 2019-05-28

## 2019-05-28 NOTE — TELEPHONE ENCOUNTER
Prescription has been signed by Jesse Davis and brought to the  for patient to . [employer] Text message sent to patient.      .Kathryn EDUARDO    [facility] [place]

## 2019-05-30 NOTE — TELEPHONE ENCOUNTER
RX picked up by patient on 05/29/2019.    .Kathryn EDUARDO    [facility] [place]

## 2019-06-24 NOTE — TELEPHONE ENCOUNTER
"Requested Prescriptions   Pending Prescriptions Disp Refills     buPROPion (WELLBUTRIN XL) 300 MG 24 hr tablet [Pharmacy Med Name: BUPROPION HCL  MG TABLET]  Last Written Prescription Date:  8-  Last Fill Quantity: 90 tablet,  # refills: 1   Last office visit: 8/28/2018 with prescribing provider:     Future Office Visit:     90 tablet 1     Sig: TAKE 1 TABLET (300 MG) BY MOUTH EVERY MORNING    SSRIs Protocol Failed    11/29/2018 11:41 AM       Failed - PHQ-9 score less than 5 in past 6 months    Please review last PHQ-9 score.          Passed - Medication is Bupropion    If the medication is Bupropion (Wellbutrin), and the patient is taking for smoking cessation; OK to refill.         Passed - Patient is age 18 or older       Passed - No active pregnancy on record       Passed - No positive pregnancy test in last 12 months       Passed - Recent (6 mo) or future (30 days) visit within the authorizing provider's specialty    Patient had office visit in the last 6 months or has a visit in the next 30 days with authorizing provider or within the authorizing provider's specialty.  See \"Patient Info\" tab in inbasket, or \"Choose Columns\" in Meds & Orders section of the refill encounter.              "
Controlled picked up by Self on 02/19/2019.  Faustino Tripathi  Patient Representative                                
Patient has refills remaining with pharmacy.  Olya Guzman RN - Triage  [facility]        
Arm band on/IV intact

## 2019-06-25 ENCOUNTER — MYC REFILL (OUTPATIENT)
Dept: FAMILY MEDICINE | Facility: CLINIC | Age: 54
End: 2019-06-25

## 2019-06-25 DIAGNOSIS — F90.0 ATTENTION DEFICIT HYPERACTIVITY DISORDER (ADHD), PREDOMINANTLY INATTENTIVE TYPE: ICD-10-CM

## 2019-06-26 NOTE — TELEPHONE ENCOUNTER
Last Written Prescription Date:  05/28/19  Last Fill Quantity: 30,  # refills: 0   Last office visit: 1/17/2019 with prescribing provider:     Future Office Visit:    Requested Prescriptions   Pending Prescriptions Disp Refills     dexmethylphenidate (FOCALIN XR) 10 MG 24 hr capsule 30 capsule 0     Sig: Take 1 capsule (10 mg) by mouth daily       There is no refill protocol information for this order        Routing refill request to provider for review/approval because:  Drug not on the [facility] refill protocol   Julisa Villagomez RN BSN  [facility]  [phone]

## 2019-06-27 RX ORDER — DEXMETHYLPHENIDATE HYDROCHLORIDE 10 MG/1
10 CAPSULE, EXTENDED RELEASE ORAL DAILY
Qty: 30 CAPSULE | Refills: 0 | Status: SHIPPED | OUTPATIENT
Start: 2019-06-27 | End: 2019-08-08

## 2019-06-28 NOTE — TELEPHONE ENCOUNTER
RX picked up by patient on 06/27/2019.    .Kathryn EDUARDO    [facility] [place]

## 2019-08-08 ENCOUNTER — MYC REFILL (OUTPATIENT)
Dept: FAMILY MEDICINE | Facility: CLINIC | Age: 54
End: 2019-08-08

## 2019-08-08 DIAGNOSIS — F90.0 ATTENTION DEFICIT HYPERACTIVITY DISORDER (ADHD), PREDOMINANTLY INATTENTIVE TYPE: ICD-10-CM

## 2019-08-08 NOTE — TELEPHONE ENCOUNTER
Last Written Prescription Date:  06/27/19  Last Fill Quantity: 30,  # refills: 0   Last office visit: 1/17/2019 with prescribing provider:     Future Office Visit:    Requested Prescriptions   Pending Prescriptions Disp Refills     dexmethylphenidate (FOCALIN XR) 10 MG 24 hr capsule 30 capsule 0     Sig: Take 1 capsule (10 mg) by mouth daily       There is no refill protocol information for this order

## 2019-08-09 RX ORDER — DEXMETHYLPHENIDATE HYDROCHLORIDE 10 MG/1
10 CAPSULE, EXTENDED RELEASE ORAL DAILY
Qty: 30 CAPSULE | Refills: 0 | Status: SHIPPED | OUTPATIENT
Start: 2019-08-09 | End: 2019-09-18

## 2019-08-12 NOTE — TELEPHONE ENCOUNTER
She is due for depression medication follow up as well.  It would be okay to do an evisit for ADHD and depression meds now, and then a OV for physical in 3-4 months.  She will be due for pap in December.     Ramandeep Salmeron MD

## 2019-08-13 ENCOUNTER — TELEPHONE (OUTPATIENT)
Dept: FAMILY MEDICINE | Facility: CLINIC | Age: 54
End: 2019-08-13

## 2019-08-13 NOTE — TELEPHONE ENCOUNTER
Reason for Call:  Other prescription    Detailed comments: Can't fax schedule 2 rx (Focalin XR 10mg 24hr cap. Must E scribe or send hard copy    Phone Number Patient can be reached at: Other phone number:  [phone]    Best Time: any    Can we leave a detailed message on this number? NO    Call taken on 8/13/2019 at 3:58 PM by Leanna Aguilar

## 2019-08-13 NOTE — TELEPHONE ENCOUNTER
Prescription for [facility]in [facility] [place] - [place]. My Chart message sent to schedule a med check.  Shelly Downey,

## 2019-08-14 NOTE — TELEPHONE ENCOUNTER
Routing to TC to ask patient to  rx that was faxed on 8/13. Route to Dr. Salmeron if easier to resend as e prescribe. Alaina Scott RN

## 2019-08-14 NOTE — TELEPHONE ENCOUNTER
RX picked up by patient on 08/14/2019.    .Kathryn EDUARDO    [facility] [place]

## 2019-09-13 ENCOUNTER — TELEPHONE (OUTPATIENT)
Dept: FAMILY MEDICINE | Facility: CLINIC | Age: 54
End: 2019-09-13

## 2019-09-13 NOTE — LETTER
[facility]  [address] [address]  [phone]       September 24, 2019    Shanique Delgado  [address]  [place] [place] [facility] [address]    Dear Shanique,    This questionnaire is about depression for your upcoming visit or contact, and your care team may not see this information before then.  We care about you.  If at any time you feel unsafe or have concerns about the safety of others please take immediate action by calling [phone], for mental health crisis phone support 24 hours a day, 365 days per year.  As always, you can also go to your local ER, or call 911 if you have immediate safety concerns.    Please complete the enclosed questionnaire and return to us at the address above.    Thank you for trusting [facility] and we appreciate the opportunity to serve you.  We look forward to supporting your healthcare needs in the future.    Healthy Regards,    Your [facility] Team

## 2019-09-13 NOTE — TELEPHONE ENCOUNTER
Pt is due now to update PHQ9.  max message sent to pt. Follow up end date 10/27/19.   PHQ-9 SCORE 8/28/2018 12/19/2018 1/17/2019   PHQ-9 Total Score - - -   PHQ-9 Total Score MyChart - - -   PHQ-9 Total Score 13 8 5     Hayder SARGENT CMA

## 2019-09-18 ENCOUNTER — MYC REFILL (OUTPATIENT)
Dept: FAMILY MEDICINE | Facility: CLINIC | Age: 54
End: 2019-09-18

## 2019-09-18 DIAGNOSIS — F90.0 ATTENTION DEFICIT HYPERACTIVITY DISORDER (ADHD), PREDOMINANTLY INATTENTIVE TYPE: ICD-10-CM

## 2019-09-18 NOTE — TELEPHONE ENCOUNTER
Controlled Substance Refill Request for dexmethylphenidate xr 10 mg  Problem List Complete:  No     PROVIDER TO CONSIDER COMPLETION OF PROBLEM LIST AND OVERVIEW/CONTROLLED SUBSTANCE AGREEMENT    Last Written Prescription Date:  8/9/19  Last Fill Quantity: 30,   # refills: 0    THE MOST RECENT OFFICE VISIT MUST BE WITHIN THE PAST 3 MONTHS. AT LEAST ONE FACE TO FACE VISIT MUST OCCUR EVERY 6 MONTHS. ADDITIONAL VISITS CAN BE VIRTUAL.  (THIS STATEMENT SHOULD BE DELETED.)    Last Office Visit with [facility] primary care provider: 2/17/19 e-visit Jaron or 1/17/19 Jaron    Future Office visit:   Next 5 appointments (look out 90 days)    Oct 10, 2019 12:40 PM CDT  MyChart Physical Adult with Ramandeep Salmeron MD  [facility] [address] [place] [address]  [phone]          Controlled substance agreement:   Encounter-Level CSA:    There are no encounter-level csa.     Patient-Level CSA:    Controlled Substance Agreement - Non - Opioid - Scan on 1/21/2019 12:43 PM: NON-OPIOID CONTROLLED SUBSTANCE AGREEMENT (below)           Last Urine Drug Screen: No results found for: CDAUT, No results found for: COMDAT, No results found for: THC13, PCP13, COC13, MAMP13, OPI13, AMP13, BZO13, TCA13, MTD13, BAR13, OXY13, PPX13, BUP13     [facility]  [facility] [employer] [facility] [address] [facility]     [email or web address]       checked in past 3 months?  No, route to RN     RX monitoring program ([place]) reviewed:  reviewed- no concerns on 9/18/19    [place]MP profile:  [email or web address]    Gianna BOLIVAR RN  EP Triage

## 2019-09-19 RX ORDER — DEXMETHYLPHENIDATE HYDROCHLORIDE 10 MG/1
10 CAPSULE, EXTENDED RELEASE ORAL DAILY
Qty: 30 CAPSULE | Refills: 0 | Status: SHIPPED | OUTPATIENT
Start: 2019-09-19 | End: 2020-11-02

## 2019-09-24 NOTE — TELEPHONE ENCOUNTER
Letter sent with PHQ9/GAD7 and self-addressed, stamped, return envelope    Julio Terry RN   [place] T[facility]

## 2019-10-01 ENCOUNTER — HEALTH MAINTENANCE LETTER (OUTPATIENT)
Age: 54
End: 2019-10-01

## 2019-10-10 ENCOUNTER — OFFICE VISIT (OUTPATIENT)
Dept: FAMILY MEDICINE | Facility: CLINIC | Age: 54
End: 2019-10-10
Payer: COMMERCIAL

## 2019-10-10 VITALS
WEIGHT: 146 LBS | SYSTOLIC BLOOD PRESSURE: 124 MMHG | TEMPERATURE: 97.7 F | DIASTOLIC BLOOD PRESSURE: 74 MMHG | OXYGEN SATURATION: 100 % | BODY MASS INDEX: 24.3 KG/M2 | HEART RATE: 91 BPM

## 2019-10-10 DIAGNOSIS — Z00.00 ROUTINE GENERAL MEDICAL EXAMINATION AT A HEALTH CARE FACILITY: Primary | ICD-10-CM

## 2019-10-10 DIAGNOSIS — Z97.5 IUD (INTRAUTERINE DEVICE) IN PLACE: ICD-10-CM

## 2019-10-10 DIAGNOSIS — F32.1 MODERATE SINGLE CURRENT EPISODE OF MAJOR DEPRESSIVE DISORDER (H): ICD-10-CM

## 2019-10-10 DIAGNOSIS — Z53.20 MAMMOGRAM DECLINED: ICD-10-CM

## 2019-10-10 DIAGNOSIS — Z13.21 ENCOUNTER FOR VITAMIN DEFICIENCY SCREENING: ICD-10-CM

## 2019-10-10 DIAGNOSIS — Z13.1 SCREENING FOR DIABETES MELLITUS: ICD-10-CM

## 2019-10-10 DIAGNOSIS — Z12.4 SCREENING FOR CERVICAL CANCER: ICD-10-CM

## 2019-10-10 DIAGNOSIS — Z13.220 SCREENING FOR HYPERLIPIDEMIA: ICD-10-CM

## 2019-10-10 DIAGNOSIS — F90.0 ATTENTION DEFICIT HYPERACTIVITY DISORDER (ADHD), PREDOMINANTLY INATTENTIVE TYPE: ICD-10-CM

## 2019-10-10 LAB — FSH SERPL-ACNC: 34.6 IU/L

## 2019-10-10 PROCEDURE — 90682 RIV4 VACC RECOMBINANT DNA IM: CPT | Performed by: INTERNAL MEDICINE

## 2019-10-10 PROCEDURE — 83001 ASSAY OF GONADOTROPIN (FSH): CPT | Performed by: INTERNAL MEDICINE

## 2019-10-10 PROCEDURE — 80061 LIPID PANEL: CPT | Performed by: INTERNAL MEDICINE

## 2019-10-10 PROCEDURE — 82306 VITAMIN D 25 HYDROXY: CPT | Performed by: INTERNAL MEDICINE

## 2019-10-10 PROCEDURE — G0124 SCREEN C/V THIN LAYER BY MD: HCPCS | Performed by: INTERNAL MEDICINE

## 2019-10-10 PROCEDURE — 90471 IMMUNIZATION ADMIN: CPT | Performed by: INTERNAL MEDICINE

## 2019-10-10 PROCEDURE — G0145 SCR C/V CYTO,THINLAYER,RESCR: HCPCS | Performed by: INTERNAL MEDICINE

## 2019-10-10 PROCEDURE — 36415 COLL VENOUS BLD VENIPUNCTURE: CPT | Performed by: INTERNAL MEDICINE

## 2019-10-10 PROCEDURE — 82947 ASSAY GLUCOSE BLOOD QUANT: CPT | Performed by: INTERNAL MEDICINE

## 2019-10-10 PROCEDURE — 99396 PREV VISIT EST AGE 40-64: CPT | Mod: 25 | Performed by: INTERNAL MEDICINE

## 2019-10-10 PROCEDURE — 87624 HPV HI-RISK TYP POOLED RSLT: CPT | Performed by: INTERNAL MEDICINE

## 2019-10-10 RX ORDER — BUPROPION HYDROCHLORIDE 300 MG/1
300 TABLET ORAL EVERY MORNING
Qty: 90 TABLET | Refills: 1 | Status: SHIPPED | OUTPATIENT
Start: 2019-10-10 | End: 2020-05-26

## 2019-10-10 RX ORDER — DEXMETHYLPHENIDATE HYDROCHLORIDE 10 MG/1
10 CAPSULE, EXTENDED RELEASE ORAL DAILY
Qty: 30 CAPSULE | Refills: 0 | Status: SHIPPED | OUTPATIENT
Start: 2019-12-19 | End: 2020-01-18

## 2019-10-10 RX ORDER — DEXMETHYLPHENIDATE HYDROCHLORIDE 10 MG/1
10 CAPSULE, EXTENDED RELEASE ORAL DAILY
Qty: 30 CAPSULE | Refills: 0 | Status: SHIPPED | OUTPATIENT
Start: 2019-11-19 | End: 2019-12-19

## 2019-10-10 RX ORDER — FLUOXETINE 40 MG/1
40 CAPSULE ORAL DAILY
Qty: 90 CAPSULE | Refills: 1 | Status: SHIPPED | OUTPATIENT
Start: 2019-10-10 | End: 2020-05-26

## 2019-10-10 RX ORDER — DEXMETHYLPHENIDATE HYDROCHLORIDE 10 MG/1
10 CAPSULE, EXTENDED RELEASE ORAL DAILY
Qty: 30 CAPSULE | Refills: 0 | Status: SHIPPED | OUTPATIENT
Start: 2019-10-19 | End: 2019-11-18

## 2019-10-10 ASSESSMENT — ANXIETY QUESTIONNAIRES
5. BEING SO RESTLESS THAT IT IS HARD TO SIT STILL: NOT AT ALL
2. NOT BEING ABLE TO STOP OR CONTROL WORRYING: SEVERAL DAYS
1. FEELING NERVOUS, ANXIOUS, OR ON EDGE: MORE THAN HALF THE DAYS
IF YOU CHECKED OFF ANY PROBLEMS ON THIS QUESTIONNAIRE, HOW DIFFICULT HAVE THESE PROBLEMS MADE IT FOR YOU TO DO YOUR WORK, TAKE CARE OF THINGS AT HOME, OR GET ALONG WITH OTHER PEOPLE: SOMEWHAT DIFFICULT
6. BECOMING EASILY ANNOYED OR IRRITABLE: SEVERAL DAYS
GAD7 TOTAL SCORE: 7
7. FEELING AFRAID AS IF SOMETHING AWFUL MIGHT HAPPEN: SEVERAL DAYS
3. WORRYING TOO MUCH ABOUT DIFFERENT THINGS: SEVERAL DAYS

## 2019-10-10 ASSESSMENT — PATIENT HEALTH QUESTIONNAIRE - PHQ9
SUM OF ALL RESPONSES TO PHQ QUESTIONS 1-9: 2
5. POOR APPETITE OR OVEREATING: SEVERAL DAYS

## 2019-10-10 NOTE — PROGRESS NOTES
SUBJECTIVE:   CC: Shanique Delgado is an 54 year old woman who presents for preventive health visit.     Shanique lives with her  and kids.  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  Her son actually was admitted last night to [facility] for mental health concerns, so that is pretty stressful.     Healthy Habits:    Do you get at least three servings of calcium containing foods daily (dairy, green leafy vegetables, etc.)? yes    Amount of exercise or daily activities, outside of work: 5 day(s) per week. She is doing pilates, loves it, helps her feel great.     Problems taking medications regularly No    Medication side effects: No    Have you had an eye exam in the past two years? yes    Do you see a dentist twice per year? yes    Do you have sleep apnea, excessive snoring or daytime drowsiness?no    Depression, ADHD - doing well on focalin 10mg (takes usually 5 days a week), wellbutrin, and fluoxetine.      PHQ-9 SCORE 12/19/2018 1/17/2019 10/10/2019   PHQ-9 Total Score - - -   PHQ-9 Total Score MyChart - - -   PHQ-9 Total Score 8 5 2       IUD - has been in longer than 5 years, can't really recall.  She is still getting pretty regular periods.  Wondering about checking hormone levels.     Would like to check vitamin D.            Abuse: Current or Past(Physical, Sexual or Emotional)- No  Do you feel safe in your environment? Yes    Social History     Tobacco Use     Smoking status: Never Smoker     Smokeless tobacco: Never Used   Substance Use Topics     Alcohol use: Yes     Alcohol/week: 0.0 standard drinks     Comment: few times per week      If you drink alcohol do you typically have >3 drinks per day or >7 drinks per week? Occasionally                      Reviewed orders with patient.  Reviewed health maintenance and updated orders accordingly - Yes  Patient Active Problem List   Diagnosis     Anxiety state     IUD (intrauterine device) in place     Moderate single current episode of major depressive disorder (H)      Attention deficit hyperactivity disorder (ADHD), predominantly inattentive type     Controlled substance agreement signed     Mammogram declined     Past Surgical History:   Procedure Laterality Date     CHOLECYSTECTOMY         Social History     Tobacco Use     Smoking status: Never Smoker     Smokeless tobacco: Never Used   Substance Use Topics     Alcohol use: Yes     Alcohol/week: 0.0 standard drinks     Comment: few times per week      Family History   Problem Relation Age of Onset     Breast Cancer Mother         age 48-      Breast Cancer Maternal Grandmother         age 50's-          Current Outpatient Medications   Medication Sig Dispense Refill     buPROPion (WELLBUTRIN XL) 300 MG 24 hr tablet Take 1 tablet (300 mg) by mouth every morning 90 tablet 1     [START ON 10/19/2019] dexmethylphenidate (FOCALIN XR) 10 MG 24 hr capsule Take 1 capsule (10 mg) by mouth daily 30 capsule 0     [START ON 2019] dexmethylphenidate (FOCALIN XR) 10 MG 24 hr capsule Take 1 capsule (10 mg) by mouth daily 30 capsule 0     [START ON 2019] dexmethylphenidate (FOCALIN XR) 10 MG 24 hr capsule Take 1 capsule (10 mg) by mouth daily 30 capsule 0     dexmethylphenidate (FOCALIN XR) 10 MG 24 hr capsule Take 1 capsule (10 mg) by mouth daily 30 capsule 0     FLUoxetine (PROZAC) 40 MG capsule Take 1 capsule (40 mg) by mouth daily 90 capsule 1     MIRENA 20 MCG/24HR IU IUD   0       Mammo discussed, not appropriate for or declined by this patient.    Pertinent mammograms are reviewed under the imaging tab.  History of abnormal Pap smear: NO - age 30-65 PAP every 5 years with negative HPV co-testing recommended  PAP / HPV 2014   PAP NIL     Reviewed and updated as needed this visit by clinical staff  Tobacco  Allergies  Meds  Problems  Med Hx  Surg Hx  Fam Hx  Soc Hx          Reviewed and updated as needed this visit by Provider  Tobacco  Problems  Med Hx  Surg Hx  Fam Hx  Soc Hx            ROS:  CONSTITUTIONAL: NEGATIVE for fever, chills, change in weight  INTEGUMENTARU/SKIN: NEGATIVE for worrisome rashes, moles or lesions  EYES: NEGATIVE for vision changes or irritation  ENT: NEGATIVE for ear, mouth and throat problems  RESP: NEGATIVE for significant cough or SOB  BREAST: NEGATIVE for masses, tenderness or discharge  CV: NEGATIVE for chest pain, palpitations or peripheral edema  GI: NEGATIVE for nausea, abdominal pain, heartburn, or change in bowel habits  : NEGATIVE for unusual urinary or vaginal symptoms. Periods are regular.  MUSCULOSKELETAL: NEGATIVE for significant arthralgias or myalgia  NEURO: NEGATIVE for weakness, dizziness or paresthesias  PSYCHIATRIC: NEGATIVE for changes in mood or affect    OBJECTIVE:   /74 (BP Location: Left arm, Patient Position: Sitting, Cuff Size: Adult Regular)   Pulse 91   Temp 97.7  F (36.5  C) (Tympanic)   Wt 66.2 kg (146 lb)   SpO2 100%   BMI 24.30 kg/m       EXAM:  GENERAL APPEARANCE: healthy, alert and no distress  EYES: Eyes grossly normal to inspection, PERRL and conjunctivae and sclerae normal  HENT: ear canals and TM's normal, mouth without ulcers or lesions, oropharynx clear and oral mucous membranes moist  NECK: no adenopathy  RESP: lungs clear to auscultation - no rales, rhonchi or wheezes  BREAST: normal without masses, tenderness or nipple discharge and no palpable axillary masses or adenopathy  CV: regular rate and rhythm, normal S1 S2, no S3 or S4, no murmur, click or rub, no peripheral edema and peripheral pulses strong  ABDOMEN: soft, nontender, no hepatosplenomegaly, no masses and bowel sounds normal   (female): normal female external genitalia and normal cervix, IUD strings in place, slight bleeding   MS: no musculoskeletal defects are noted and gait is age appropriate without ataxia  SKIN: no suspicious lesions or rashes  NEURO: Normal strength and tone, sensory exam grossly normal, mentation intact and speech  normal  PSYCH: mentation appears normal and affect normal/bright        ASSESSMENT/PLAN:   1. Routine general medical examination at a health care facility  - C RIV4 (FLUBLOK) VACCINE RECOMBINANT DNA PRSRV ANTIBIO FREE, IM [91818]    2. Moderate single current episode of major depressive disorder (H)  Symptoms well controlled on current regimen   - buPROPion (WELLBUTRIN XL) 300 MG 24 hr tablet; Take 1 tablet (300 mg) by mouth every morning  Dispense: 90 tablet; Refill: 1  - FLUoxetine (PROZAC) 40 MG capsule; Take 1 capsule (40 mg) by mouth daily  Dispense: 90 capsule; Refill: 1    3. Attention deficit hyperactivity disorder (ADHD), predominantly inattentive type  Doing well on current medication.  BP is normal.   - dexmethylphenidate (FOCALIN XR) 10 MG 24 hr capsule; Take 1 capsule (10 mg) by mouth daily  Dispense: 30 capsule; Refill: 0  - dexmethylphenidate (FOCALIN XR) 10 MG 24 hr capsule; Take 1 capsule (10 mg) by mouth daily  Dispense: 30 capsule; Refill: 0  - dexmethylphenidate (FOCALIN XR) 10 MG 24 hr capsule; Take 1 capsule (10 mg) by mouth daily  Dispense: 30 capsule; Refill: 0    4. IUD (intrauterine device) in place  Recommended she have this removed since it is unlikely doing anything at this point, will check FSH  - Follicle stimulating hormone    5. Encounter for vitamin deficiency screening  She would like to check vitamin D   - Vitamin D Deficiency    6. Screening for diabetes mellitus  - Glucose    7. Screening for hyperlipidemia  - Lipid panel reflex to direct LDL Fasting    8. Screening for cervical cancer  - HPV High Risk Types DNA Cervical  - Pap imaged thin layer screen with HPV - recommended age 30 - 65 years (select HPV order below)    9. Mammogram declined  Her mother  in her 40s of breast cancer but Shanique refuses mammogram. States she does self breast exams.       COUNSELING:   Reviewed preventive health counseling, as reflected in patient instructions  Special attention given to:         "Regular exercise       Healthy diet/nutrition       Osteoporosis Prevention/Bone Health       (Kala)menopause management    Estimated body mass index is 24.3 kg/m  as calculated from the following:    Height as of 1/17/19: 1.651 m (5' 5\").    Weight as of this encounter: 66.2 kg (146 lb).         reports that she has never smoked. She has never used smokeless tobacco.      Counseling Resources:  ATP IV Guidelines  Pooled Cohorts Equation Calculator  Breast Cancer Risk Calculator  FRAX Risk Assessment  ICSI Preventive Guidelines  Dietary Guidelines for Americans, 2010  [employer] MyPlate  ASA Prophylaxis  Lung CA Screening    Ramandeep Salmeron MD  [facility] [place]    Answers for HPI/ROS submitted by the patient on 10/9/2019   Annual Exam:  Frequency of exercise:: 4-5 days/week  Getting at least 3 servings of Calcium per day:: Yes  Diet:: Carbohydrate counting  Taking medications regularly:: Yes  Medication side effects:: Not applicable  Bi-annual eye exam:: NO  Dental care twice a year:: Yes  Sleep apnea or symptoms of sleep apnea:: None  Additional concerns today:: No  Duration of exercise:: 45-60 minutes    "

## 2019-10-11 LAB
CHOLEST SERPL-MCNC: 233 MG/DL
DEPRECATED CALCIDIOL+CALCIFEROL SERPL-MC: 33 UG/L (ref 20–75)
GLUCOSE SERPL-MCNC: 85 MG/DL (ref 70–99)
HDLC SERPL-MCNC: 57 MG/DL
LDLC SERPL CALC-MCNC: 159 MG/DL
NONHDLC SERPL-MCNC: 176 MG/DL
TRIGL SERPL-MCNC: 84 MG/DL

## 2019-10-11 ASSESSMENT — ANXIETY QUESTIONNAIRES: GAD7 TOTAL SCORE: 7

## 2019-10-17 LAB
COPATH REPORT: NORMAL
PAP: NORMAL

## 2019-10-18 LAB
FINAL DIAGNOSIS: NORMAL
HPV HR 12 DNA CVX QL NAA+PROBE: NEGATIVE
HPV16 DNA SPEC QL NAA+PROBE: NEGATIVE
HPV18 DNA SPEC QL NAA+PROBE: NEGATIVE
SPECIMEN DESCRIPTION: NORMAL
SPECIMEN SOURCE CVX/VAG CYTO: NORMAL

## 2020-01-27 ENCOUNTER — MYC MEDICAL ADVICE (OUTPATIENT)
Dept: FAMILY MEDICINE | Facility: CLINIC | Age: 55
End: 2020-01-27

## 2020-01-27 DIAGNOSIS — F90.0 ATTENTION DEFICIT HYPERACTIVITY DISORDER (ADHD), PREDOMINANTLY INATTENTIVE TYPE: ICD-10-CM

## 2020-01-27 RX ORDER — DEXMETHYLPHENIDATE HYDROCHLORIDE 10 MG/1
10 CAPSULE, EXTENDED RELEASE ORAL DAILY
Qty: 30 CAPSULE | Refills: 0 | Status: SHIPPED | OUTPATIENT
Start: 2020-02-27 | End: 2020-03-13

## 2020-01-27 RX ORDER — DEXMETHYLPHENIDATE HYDROCHLORIDE 10 MG/1
10 CAPSULE, EXTENDED RELEASE ORAL DAILY
Qty: 30 CAPSULE | Refills: 0 | Status: CANCELLED | OUTPATIENT
Start: 2020-01-27

## 2020-01-27 RX ORDER — DEXMETHYLPHENIDATE HYDROCHLORIDE 10 MG/1
10 CAPSULE, EXTENDED RELEASE ORAL DAILY
Qty: 30 CAPSULE | Refills: 0 | Status: SHIPPED | OUTPATIENT
Start: 2020-03-29 | End: 2020-04-28

## 2020-01-27 RX ORDER — DEXMETHYLPHENIDATE HYDROCHLORIDE 10 MG/1
10 CAPSULE, EXTENDED RELEASE ORAL DAILY
Qty: 30 CAPSULE | Refills: 0 | Status: SHIPPED | OUTPATIENT
Start: 2020-01-27 | End: 2020-02-26

## 2020-01-27 NOTE — TELEPHONE ENCOUNTER
Please see Maest[employer] message and advise. Thank you very much.     We did not receive a refill request for medication. Patient is completely out of medication.     Pended medication and pharmacy.     RX monitoring program ([place]MP) reviewed:  not reviewed/not due - last done on 9/18/19    [place]MP profile:  [email or web address]      Alfreda Ramey RN, BSN  [facility]

## 2020-04-08 DIAGNOSIS — F32.1 MODERATE SINGLE CURRENT EPISODE OF MAJOR DEPRESSIVE DISORDER (H): ICD-10-CM

## 2020-04-08 ASSESSMENT — ANXIETY QUESTIONNAIRES
2. NOT BEING ABLE TO STOP OR CONTROL WORRYING: SEVERAL DAYS
GAD7 TOTAL SCORE: 8
IF YOU CHECKED OFF ANY PROBLEMS ON THIS QUESTIONNAIRE, HOW DIFFICULT HAVE THESE PROBLEMS MADE IT FOR YOU TO DO YOUR WORK, TAKE CARE OF THINGS AT HOME, OR GET ALONG WITH OTHER PEOPLE: VERY DIFFICULT
6. BECOMING EASILY ANNOYED OR IRRITABLE: SEVERAL DAYS
5. BEING SO RESTLESS THAT IT IS HARD TO SIT STILL: NOT AT ALL
1. FEELING NERVOUS, ANXIOUS, OR ON EDGE: NEARLY EVERY DAY
3. WORRYING TOO MUCH ABOUT DIFFERENT THINGS: SEVERAL DAYS
7. FEELING AFRAID AS IF SOMETHING AWFUL MIGHT HAPPEN: SEVERAL DAYS

## 2020-04-08 ASSESSMENT — PATIENT HEALTH QUESTIONNAIRE - PHQ9
5. POOR APPETITE OR OVEREATING: SEVERAL DAYS
SUM OF ALL RESPONSES TO PHQ QUESTIONS 1-9: 4

## 2020-04-08 NOTE — TELEPHONE ENCOUNTER
Rx denied to pharmacy.  Spoke with pt who states she is taking fluoxetine 20 mg 2 capsules daily (40 mg).  Thinks the rx for 3 tabs daily was on auto refill.  Pt states she has enough medication at home.    Pt has updated PHQ 9 for panel management    PHQ-9 SCORE 1/17/2019 10/10/2019 4/8/2020   PHQ-9 Total Score - - -   PHQ-9 Total Score MyChart - - -   PHQ-9 Total Score 5 2 4     Pt states she is concerned about the pandemic that is going on.    Pt reports taking fluoxetine 20 mg 2 tabs (40 mg) daily as prescribed    Denies any concerns with medications.    Pt last seen 10/10/19.    Gianna BOLIVAR RN  EP Triage

## 2020-04-09 ASSESSMENT — ANXIETY QUESTIONNAIRES: GAD7 TOTAL SCORE: 8

## 2020-05-19 DIAGNOSIS — F32.1 MODERATE SINGLE CURRENT EPISODE OF MAJOR DEPRESSIVE DISORDER (H): ICD-10-CM

## 2020-05-19 NOTE — TELEPHONE ENCOUNTER
Patient per Dr. Salmeron's note is due for a 6 month follow up medication check. Please schedule virtual visit for patient, once patient is scheduled, route back to triage to address refill.    Alfreda Ramey RN, BSN  [facility]

## 2020-05-21 NOTE — TELEPHONE ENCOUNTER
Patient has not scheduled an appointment yet. Routing back to TC to try and attempt calling patient again to get scheduled so refill can be addressed.    Alfreda Ramey RN, BSN  [facility]

## 2020-05-23 ENCOUNTER — E-VISIT (OUTPATIENT)
Dept: FAMILY MEDICINE | Facility: CLINIC | Age: 55
End: 2020-05-23
Payer: COMMERCIAL

## 2020-05-23 DIAGNOSIS — F32.1 MODERATE SINGLE CURRENT EPISODE OF MAJOR DEPRESSIVE DISORDER (H): ICD-10-CM

## 2020-05-23 DIAGNOSIS — F90.0 ATTENTION DEFICIT HYPERACTIVITY DISORDER (ADHD), PREDOMINANTLY INATTENTIVE TYPE: ICD-10-CM

## 2020-05-23 PROCEDURE — 99421 OL DIG E/M SVC 5-10 MIN: CPT | Performed by: INTERNAL MEDICINE

## 2020-05-23 ASSESSMENT — ANXIETY QUESTIONNAIRES
5. BEING SO RESTLESS THAT IT IS HARD TO SIT STILL: NOT AT ALL
7. FEELING AFRAID AS IF SOMETHING AWFUL MIGHT HAPPEN: MORE THAN HALF THE DAYS
4. TROUBLE RELAXING: NEARLY EVERY DAY
2. NOT BEING ABLE TO STOP OR CONTROL WORRYING: NEARLY EVERY DAY
GAD7 TOTAL SCORE: 17
GAD7 TOTAL SCORE: 17
3. WORRYING TOO MUCH ABOUT DIFFERENT THINGS: NEARLY EVERY DAY
1. FEELING NERVOUS, ANXIOUS, OR ON EDGE: NEARLY EVERY DAY
6. BECOMING EASILY ANNOYED OR IRRITABLE: NEARLY EVERY DAY
GAD7 TOTAL SCORE: 17
7. FEELING AFRAID AS IF SOMETHING AWFUL MIGHT HAPPEN: MORE THAN HALF THE DAYS

## 2020-05-23 ASSESSMENT — PATIENT HEALTH QUESTIONNAIRE - PHQ9
SUM OF ALL RESPONSES TO PHQ QUESTIONS 1-9: 7
10. IF YOU CHECKED OFF ANY PROBLEMS, HOW DIFFICULT HAVE THESE PROBLEMS MADE IT FOR YOU TO DO YOUR WORK, TAKE CARE OF THINGS AT HOME, OR GET ALONG WITH OTHER PEOPLE: SOMEWHAT DIFFICULT
SUM OF ALL RESPONSES TO PHQ QUESTIONS 1-9: 7

## 2020-05-24 ASSESSMENT — ANXIETY QUESTIONNAIRES: GAD7 TOTAL SCORE: 17

## 2020-05-24 ASSESSMENT — PATIENT HEALTH QUESTIONNAIRE - PHQ9: SUM OF ALL RESPONSES TO PHQ QUESTIONS 1-9: 7

## 2020-05-26 RX ORDER — DEXMETHYLPHENIDATE HYDROCHLORIDE 10 MG/1
10 CAPSULE, EXTENDED RELEASE ORAL DAILY
Qty: 30 CAPSULE | Refills: 0 | Status: SHIPPED | OUTPATIENT
Start: 2020-06-10 | End: 2020-07-31

## 2020-05-26 RX ORDER — BUPROPION HYDROCHLORIDE 300 MG/1
300 TABLET ORAL EVERY MORNING
Qty: 90 TABLET | Refills: 1 | Status: SHIPPED | OUTPATIENT
Start: 2020-05-26 | End: 2020-11-27

## 2020-05-26 RX ORDER — FLUOXETINE 40 MG/1
40 CAPSULE ORAL DAILY
Qty: 90 CAPSULE | Refills: 1 | Status: SHIPPED | OUTPATIENT
Start: 2020-05-26 | End: 2020-11-27

## 2020-05-26 RX ORDER — BUPROPION HYDROCHLORIDE 300 MG/1
TABLET ORAL
Qty: 90 TABLET | Refills: 1 | OUTPATIENT
Start: 2020-05-26

## 2020-09-17 ENCOUNTER — MYC REFILL (OUTPATIENT)
Dept: FAMILY MEDICINE | Facility: CLINIC | Age: 55
End: 2020-09-17

## 2020-09-17 DIAGNOSIS — F90.0 ATTENTION DEFICIT HYPERACTIVITY DISORDER (ADHD), PREDOMINANTLY INATTENTIVE TYPE: ICD-10-CM

## 2020-09-17 RX ORDER — DEXMETHYLPHENIDATE HYDROCHLORIDE 10 MG/1
10 CAPSULE, EXTENDED RELEASE ORAL DAILY
Qty: 30 CAPSULE | Refills: 0 | Status: SHIPPED | OUTPATIENT
Start: 2020-09-17 | End: 2020-10-02

## 2020-09-17 NOTE — TELEPHONE ENCOUNTER
Last Written Prescription Date:  7/31/2020  Last Fill Quantity: 30,  # refills: 0   Last office visit: 10/10/2019 with prescribing provider:     Future Office Visit:      Requested Prescriptions   Pending Prescriptions Disp Refills     dexmethylphenidate (FOCALIN XR) 10 MG 24 hr capsule 30 capsule 0     Sig: Take 1 capsule (10 mg) by mouth daily       There is no refill protocol information for this order        Routing refill request to provider for review/approval because:  Drug not on the [facility] refill protocol       RX monitoring program ([place]MP) reviewed:  not reviewed/not due - last done on 5/11/2020    [place]MP profile:  [email or web address]    Alfreda Ramey RN, BSN  [facility]

## 2020-09-17 NOTE — LETTER
September 23, 2020      Shanique Delgado  [address] [place] [address]        Dear Shanique,       Our records indicates that it is time for you to be seen for an office visit with Dr. Salmeron.    Please call our office at [phone] to schedule an appointment for a fasting physical after 10/10/20.     Please disregard this notice if you have already made an appointment.    If you are no longer a [facility] patient; Please contact us and let us know that as well. You will also need to the let the pharmacy know the name of your current Provider so that they can send future request to them.       Sincerely,    [facility] Staff        Sincerely,        Ramandeep Salmeron MD

## 2020-10-02 ENCOUNTER — OFFICE VISIT (OUTPATIENT)
Dept: FAMILY MEDICINE | Facility: CLINIC | Age: 55
End: 2020-10-02
Payer: COMMERCIAL

## 2020-10-02 VITALS
WEIGHT: 155.6 LBS | SYSTOLIC BLOOD PRESSURE: 120 MMHG | HEART RATE: 98 BPM | OXYGEN SATURATION: 97 % | TEMPERATURE: 96.7 F | DIASTOLIC BLOOD PRESSURE: 70 MMHG | BODY MASS INDEX: 25.01 KG/M2 | HEIGHT: 66 IN

## 2020-10-02 DIAGNOSIS — Z30.432 ENCOUNTER FOR IUD REMOVAL: Primary | ICD-10-CM

## 2020-10-02 DIAGNOSIS — Z12.31 ENCOUNTER FOR SCREENING MAMMOGRAM FOR BREAST CANCER: ICD-10-CM

## 2020-10-02 PROCEDURE — 58301 REMOVE INTRAUTERINE DEVICE: CPT | Performed by: FAMILY MEDICINE

## 2020-10-02 ASSESSMENT — PATIENT HEALTH QUESTIONNAIRE - PHQ9: SUM OF ALL RESPONSES TO PHQ QUESTIONS 1-9: 10

## 2020-10-02 ASSESSMENT — MIFFLIN-ST. JEOR: SCORE: 1323.58

## 2020-10-02 NOTE — PROGRESS NOTES
"Subjective     Shanique Delgado is a 54 year old female who presents to clinic today for the following health issues:    HPI         She needs her IUD removed.  She is 55 years old now.  She does not need contraception at this point.    /70 (BP Location: Right arm, Patient Position: Sitting, Cuff Size: Adult Large)   Pulse 98   Temp 96.7  F (35.9  C) (Tympanic)   Ht 1.67 m (5' 5.75\")   Wt 70.6 kg (155 lb 9.6 oz)   SpO2 97%   BMI 25.31 kg/m      IUD Removal Procedure Note    Procedure Details   The risks (including infection, bleeding, pain, and uterine perforation) and benefits of the procedure were explained to the patient and  informed consent was obtained.   The patient was placed in the dorsal lithotomy position. A Graves' speculum inserted in the vagina.  The stings of the IUD were easily visualized. They were grasped with a ring forceps. The IUD was removed without difficulty. Inspection of the Mirena  showed that it was removed intact.   Condition:  Stable  Complications:  None apparent      ICD-10-CM    1. Encounter for IUD removal  Z30.432 REMOVE INTRAUTERINE DEVICE   2. Encounter for screening mammogram for breast cancer  Z12.31 *MA Screening Digital Bilateral       Plan:  The patient was advised to call for any fever or for prolonged or severe pain or bleeding. She was advised to use OTC analgesics as needed for mild to moderate pain.    Breast mammogram ordered.    Iesha Caba MD  [facility]               "

## 2020-11-13 ENCOUNTER — VIRTUAL VISIT (OUTPATIENT)
Dept: FAMILY MEDICINE | Facility: CLINIC | Age: 55
End: 2020-11-13
Payer: COMMERCIAL

## 2020-11-13 DIAGNOSIS — H66.93 ACUTE INFECTION OF BOTH EARS: Primary | ICD-10-CM

## 2020-11-13 PROCEDURE — 99213 OFFICE O/P EST LOW 20 MIN: CPT | Mod: GT | Performed by: INTERNAL MEDICINE

## 2020-11-13 NOTE — PROGRESS NOTES
"Shanique Delgado is a 55 year old female who is being evaluated via a billable video visit.      The patient has been notified of following:     \"This video visit will be conducted via a call between you and your physician/provider. We have found that certain health care needs can be provided without the need for an in-person physical exam.  This service lets us provide the care you need with a video conversation.  If a prescription is necessary we can send it directly to your pharmacy.  If lab work is needed we can place an order for that and you can then stop by our lab to have the test done at a later time.    Video visits are billed at different rates depending on your insurance coverage.  Please reach out to your insurance provider with any questions.    If during the course of the call the physician/provider feels a video visit is not appropriate, you will not be charged for this service.\"    Patient has given verbal consent for Video visit? Yes  How would you like to obtain your AVS? MyChart  If you are dropped from the video visit, the video invite should be resent to: Text to cell phone: [phone]  Will anyone else be joining your video visit? No    Subjective     Shanique Delgado is a 55 year old female who presents today via video visit for the following health issues:    HPI     Acute Illness  Acute illness concerns:  Negative COVID test  Onset/Duration: x 2 weeks  Symptoms:  Fever: YES- low grade  Chills/Sweats: YES  Headache (location?): YES  Sinus Pressure: no  Conjunctivitis:  no  Ear Pain: YES: both  Rhinorrhea: YES  Congestion: no  Sore Throat: YES  Cough: YES  Wheeze: no  Decreased Appetite: no  Nausea: no  Vomiting: no  Diarrhea: no  Dysuria/Freq.: no  Dysuria or Hematuria: no  Fatigue/Achiness: YES  Sick/Strep Exposure: no  Therapies tried and outcome: ibuprofen and tylenol    CC - continued feeling of acute illness.      Just over two weeks ago started feeling ill with headache, sore " "throat, off and on runny nose.  Initially she thought it might be allergies.  She is also developed an infrequent cough and has felt feverish and chilled.  Her highest temp at home was 99.9, she does feel hot and chilled at that time.  She states her symptoms have been a \"roller coaster\" since that time.  Symptoms are lingering, not really getting worse or better.  She did end up having a nasal swab for Covid about 8 days of symptoms.  That did return negative.  She has not been around anybody sick, and no one else is got her same illness at home.  She feels short of breath when she has to talk for a long time on her video meetings.  Her apple watch alerted her that her heart rate went up to 120 when she was climbing the stairs the other day.  She feels fatigued, sleeping quite a bit.  Appetite is okay.  She still has a headache.  No sinus pressure, but both ears feel clogged and painful.  She is not sure whether this might represent a bacterial infection.        Video Start Time: 10:20 AM        Review of Systems   Constitutional, HEENT, cardiovascular, pulmonary, gi and gu systems are negative, except as otherwise noted.      Objective           Vitals:  No vitals were obtained today due to virtual visit.    Physical Exam     GENERAL: tired appearing, alert and no distress   EYES: Eyes grossly normal to inspection.  No discharge or erythema, or obvious scleral/conjunctival abnormalities.  RESP: No audible wheeze, cough, or visible cyanosis.  No visible retractions or increased work of breathing.    SKIN: Visible skin clear. No significant rash, abnormal pigmentation or lesions.  NEURO: Cranial nerves grossly intact.  Mentation and speech appropriate for age.  PSYCH: Mentation appears normal, affect normal/bright, judgement and insight intact, normal speech and appearance well-groomed.              Assessment & Plan     Acute infection of both ears  Unclear etiology of her symptoms.  She did not get her Covid test " "until over a week of symptoms, so the sensitivity would be lower.  This still could represent a coronavirus infection, though it is reassuring no one else has gotten sick around her.  Possible ear or sinus infection, will treat empirically with Augmentin.  If she is not feeling better in 48 to 72 hours, will let me know and plan to come in for lab only.  Can also check a Covid serology at that time since she will have been over 2 weeks of symptoms.  - amoxicillin-clavulanate (AUGMENTIN) 875-125 MG tablet; Take 1 tablet by mouth 2 times daily for 7 days     BMI:   Estimated body mass index is 25.31 kg/m  as calculated from the following:    Height as of 10/2/20: 1.67 m (5' 5.75\").    Weight as of 10/2/20: 70.6 kg (155 lb 9.6 oz).              Return in about 3 days (around 11/16/2020) for let me know how you are feeling .    Ramandeep Salmeron MD  [facility]      Video-Visit Details    Type of service:  Video Visit    Video End Time:10:27 AM    Originating Location (pt. Location): Home    Distant Location (provider location):  [facility]     Platform used for Video Visit: Nan            "

## 2020-11-16 ENCOUNTER — TELEPHONE (OUTPATIENT)
Dept: FAMILY MEDICINE | Facility: CLINIC | Age: 55
End: 2020-11-16

## 2020-11-16 DIAGNOSIS — R06.02 SHORTNESS OF BREATH: Primary | ICD-10-CM

## 2020-11-16 DIAGNOSIS — R53.81 MALAISE: ICD-10-CM

## 2020-11-16 NOTE — TELEPHONE ENCOUNTER
Left a message that we need to know if symptoms have worsened or improved at all.    Julisa DASILVARN BSN  [facility]  [phone]

## 2020-11-16 NOTE — TELEPHONE ENCOUNTER
Acute Illness   Acute illness concerns: fever, congestion, headaches, SOB on movement   Onset: 2.5 weeks     Fever: YES- 99.8    Chills/Sweats: YES- both     Headache (location?): YES- feels likes vice on the sides of head     Sinus Pressure:no    Conjunctivitis:  no    Ear Pain: YES: bilateral    Rhinorrhea: YES- clear     Congestion: YES    Sore Throat: no      Cough: YES-non-productive    Wheeze: no     Decreased Appetite: YES    Nausea: no     Vomiting: no     Diarrhea:  no     Dysuria/Freq.: no     Fatigue/Achiness: YES- both     Sick/Strep Exposure: no      Therapies Tried and outcome: Augmentin. Ibuprofen, - not helped, she did try mucinex last week and it did not help              Routing to PCP for review     Thank you     Jennifer Rodarte RN, BSN  [facility]

## 2020-11-16 NOTE — TELEPHONE ENCOUNTER
Patient calling back, stated she is not feeling better: headache, low grade fever, congestion, shortness of breath. Had an appt on Friday, advised to call back in 3x days.   Please advise   # [phone]  Ok to leave detailed message: yes  Thank you  Hilda Frazier

## 2020-11-16 NOTE — TELEPHONE ENCOUNTER
Labs and CXR ordered as future if she can come in perhaps tomorrow to do those.     Ramandeep Salmeron MD

## 2020-11-16 NOTE — TELEPHONE ENCOUNTER
S/w pt and gave Dr. Salmeron's reply below and scheduled lab and cxr.    Pt states understanding    Gianna BOLIVAR RN  EP Triage

## 2020-11-17 ENCOUNTER — ANCILLARY PROCEDURE (OUTPATIENT)
Dept: GENERAL RADIOLOGY | Facility: CLINIC | Age: 55
End: 2020-11-17
Attending: INTERNAL MEDICINE
Payer: COMMERCIAL

## 2020-11-17 DIAGNOSIS — R53.81 MALAISE: ICD-10-CM

## 2020-11-17 DIAGNOSIS — R06.02 SHORTNESS OF BREATH: ICD-10-CM

## 2020-11-17 LAB
BASOPHILS # BLD AUTO: 0 10E9/L (ref 0–0.2)
BASOPHILS NFR BLD AUTO: 0.3 %
CRP SERPL-MCNC: <2.9 MG/L (ref 0–8)
DIFFERENTIAL METHOD BLD: NORMAL
EOSINOPHIL # BLD AUTO: 0 10E9/L (ref 0–0.7)
EOSINOPHIL NFR BLD AUTO: 0.5 %
ERYTHROCYTE [DISTWIDTH] IN BLOOD BY AUTOMATED COUNT: 11.9 % (ref 10–15)
ERYTHROCYTE [SEDIMENTATION RATE] IN BLOOD BY WESTERGREN METHOD: 7 MM/H (ref 0–30)
HCT VFR BLD AUTO: 41.8 % (ref 35–47)
HGB BLD-MCNC: 14.5 G/DL (ref 11.7–15.7)
LYMPHOCYTES # BLD AUTO: 2.3 10E9/L (ref 0.8–5.3)
LYMPHOCYTES NFR BLD AUTO: 34.7 %
MCH RBC QN AUTO: 31 PG (ref 26.5–33)
MCHC RBC AUTO-ENTMCNC: 34.7 G/DL (ref 31.5–36.5)
MCV RBC AUTO: 89 FL (ref 78–100)
MONOCYTES # BLD AUTO: 0.4 10E9/L (ref 0–1.3)
MONOCYTES NFR BLD AUTO: 6.1 %
NEUTROPHILS # BLD AUTO: 3.8 10E9/L (ref 1.6–8.3)
NEUTROPHILS NFR BLD AUTO: 58.4 %
PLATELET # BLD AUTO: 303 10E9/L (ref 150–450)
PROCALCITONIN SERPL-MCNC: <0.05 NG/ML
RBC # BLD AUTO: 4.68 10E12/L (ref 3.8–5.2)
WBC # BLD AUTO: 6.5 10E9/L (ref 4–11)

## 2020-11-17 PROCEDURE — 86140 C-REACTIVE PROTEIN: CPT | Performed by: INTERNAL MEDICINE

## 2020-11-17 PROCEDURE — 84145 PROCALCITONIN (PCT): CPT | Performed by: INTERNAL MEDICINE

## 2020-11-17 PROCEDURE — 85652 RBC SED RATE AUTOMATED: CPT | Performed by: INTERNAL MEDICINE

## 2020-11-17 PROCEDURE — 86769 SARS-COV-2 COVID-19 ANTIBODY: CPT | Performed by: INTERNAL MEDICINE

## 2020-11-17 PROCEDURE — 71046 X-RAY EXAM CHEST 2 VIEWS: CPT | Performed by: RADIOLOGY

## 2020-11-17 PROCEDURE — 85025 COMPLETE CBC W/AUTO DIFF WBC: CPT | Performed by: INTERNAL MEDICINE

## 2020-11-17 PROCEDURE — 36415 COLL VENOUS BLD VENIPUNCTURE: CPT | Performed by: INTERNAL MEDICINE

## 2020-11-19 ENCOUNTER — MYC MEDICAL ADVICE (OUTPATIENT)
Dept: FAMILY MEDICINE | Facility: CLINIC | Age: 55
End: 2020-11-19

## 2020-11-19 LAB
COVID-19 SPIKE RBD ABY TITER: NORMAL
COVID-19 SPIKE RBD ABY: NEGATIVE

## 2020-11-19 NOTE — TELEPHONE ENCOUNTER
Please review my chart message and advise.    Thanks for the test results. I still have a headache and ear pain. The ear pain woke me up last night - - both sides. I can feel fluid shift when laying on my side. Is there a way to alleviate this? All other symptoms are minor - sniffles (clear liquid), congested, sore throat, fatigue, off and on low-grade fever typically higher in the evening.     I am trying again today - Daytime Flu capsules with phenylephrine HCI - 5mg. Is that good? Or should I try something else. Last week I tried using Mucinex for a few days and didn't notice any change.    Gianna BOLIVAR RN  EP Triage

## 2020-11-24 ENCOUNTER — MYC MEDICAL ADVICE (OUTPATIENT)
Dept: FAMILY MEDICINE | Facility: CLINIC | Age: 55
End: 2020-11-24

## 2021-01-15 ENCOUNTER — HEALTH MAINTENANCE LETTER (OUTPATIENT)
Age: 56
End: 2021-01-15

## 2021-01-22 ENCOUNTER — MYC REFILL (OUTPATIENT)
Dept: FAMILY MEDICINE | Facility: CLINIC | Age: 56
End: 2021-01-22

## 2021-01-22 DIAGNOSIS — F90.0 ATTENTION DEFICIT HYPERACTIVITY DISORDER (ADHD), PREDOMINANTLY INATTENTIVE TYPE: ICD-10-CM

## 2021-01-22 RX ORDER — DEXMETHYLPHENIDATE HYDROCHLORIDE 10 MG/1
10 CAPSULE, EXTENDED RELEASE ORAL DAILY
Qty: 30 CAPSULE | Refills: 0 | Status: SHIPPED | OUTPATIENT
Start: 2021-01-22 | End: 2021-03-10

## 2021-01-23 ENCOUNTER — HEALTH MAINTENANCE LETTER (OUTPATIENT)
Age: 56
End: 2021-01-23

## 2021-02-05 ENCOUNTER — TELEPHONE (OUTPATIENT)
Dept: FAMILY MEDICINE | Facility: CLINIC | Age: 56
End: 2021-02-05

## 2021-02-05 NOTE — TELEPHONE ENCOUNTER
Needs of attention regarding:  -Breast Cancer Screening  -Wellness (Physical) Visit     Health Maintenance Topics with due status: Overdue       Topic Date Due    HIV SCREENING 10/07/1981    HEPATITIS C SCREENING 10/07/1984    MAMMO SCREENING 12/15/2006    ZOSTER IMMUNIZATION 10/07/2016    PREVENTIVE CARE VISIT 10/10/2020       Communication:  See MyChart message    Jayshree Corey on 2/5/2021 at 12:28 PM

## 2021-02-11 NOTE — TELEPHONE ENCOUNTER
Called and left a voicemail for the patient to call the clinic and schedule an appointment.    Jayshree Corey on 2/11/2021 at 9:04 AM

## 2021-02-16 NOTE — TELEPHONE ENCOUNTER
Called and left a voicemail for the patient to call the clinic and schedule an appointment.    Jayshree Corey on 2/16/2021 at 3:19 PM

## 2021-05-25 DIAGNOSIS — F32.1 MODERATE SINGLE CURRENT EPISODE OF MAJOR DEPRESSIVE DISORDER (H): ICD-10-CM

## 2021-05-26 ENCOUNTER — MYC MEDICAL ADVICE (OUTPATIENT)
Dept: FAMILY MEDICINE | Facility: CLINIC | Age: 56
End: 2021-05-26

## 2021-05-27 NOTE — TELEPHONE ENCOUNTER
Attempt #2:    Left message for pt to check mychart message and complete questionnaire so we can complete refill. Call clinic with any questions [phone].     Joselyn Hawkins RN

## 2021-05-28 RX ORDER — BUPROPION HYDROCHLORIDE 300 MG/1
TABLET ORAL
Qty: 90 TABLET | Refills: 0 | Status: SHIPPED | OUTPATIENT
Start: 2021-05-28 | End: 2021-08-25

## 2021-05-28 RX ORDER — FLUOXETINE 40 MG/1
CAPSULE ORAL
Qty: 90 CAPSULE | Refills: 0 | Status: SHIPPED | OUTPATIENT
Start: 2021-05-28 | End: 2021-08-25

## 2021-05-28 ASSESSMENT — ANXIETY QUESTIONNAIRES
4. TROUBLE RELAXING: SEVERAL DAYS
7. FEELING AFRAID AS IF SOMETHING AWFUL MIGHT HAPPEN: NOT AT ALL
3. WORRYING TOO MUCH ABOUT DIFFERENT THINGS: SEVERAL DAYS
GAD7 TOTAL SCORE: 5
2. NOT BEING ABLE TO STOP OR CONTROL WORRYING: SEVERAL DAYS
GAD7 TOTAL SCORE: 5
1. FEELING NERVOUS, ANXIOUS, OR ON EDGE: SEVERAL DAYS
7. FEELING AFRAID AS IF SOMETHING AWFUL MIGHT HAPPEN: NOT AT ALL
5. BEING SO RESTLESS THAT IT IS HARD TO SIT STILL: NOT AT ALL
6. BECOMING EASILY ANNOYED OR IRRITABLE: SEVERAL DAYS
GAD7 TOTAL SCORE: 5

## 2021-05-28 ASSESSMENT — PATIENT HEALTH QUESTIONNAIRE - PHQ9
10. IF YOU CHECKED OFF ANY PROBLEMS, HOW DIFFICULT HAVE THESE PROBLEMS MADE IT FOR YOU TO DO YOUR WORK, TAKE CARE OF THINGS AT HOME, OR GET ALONG WITH OTHER PEOPLE: SOMEWHAT DIFFICULT
SUM OF ALL RESPONSES TO PHQ QUESTIONS 1-9: 6
SUM OF ALL RESPONSES TO PHQ QUESTIONS 1-9: 6

## 2021-05-28 NOTE — TELEPHONE ENCOUNTER
Routing refill request to provider for review/approval because:  PHQ 5/23/2020 10/2/2020 5/28/2021   PHQ-9 Total Score 7 10 6   Q9: Thoughts of better off dead/self-harm past 2 weeks Not at all Not at all Not at all       Joselyn Hawkins RN

## 2021-05-29 ASSESSMENT — PATIENT HEALTH QUESTIONNAIRE - PHQ9: SUM OF ALL RESPONSES TO PHQ QUESTIONS 1-9: 6

## 2021-05-29 ASSESSMENT — ANXIETY QUESTIONNAIRES: GAD7 TOTAL SCORE: 5

## 2021-06-07 ENCOUNTER — MYC REFILL (OUTPATIENT)
Dept: FAMILY MEDICINE | Facility: CLINIC | Age: 56
End: 2021-06-07

## 2021-06-07 DIAGNOSIS — F90.0 ATTENTION DEFICIT HYPERACTIVITY DISORDER (ADHD), PREDOMINANTLY INATTENTIVE TYPE: ICD-10-CM

## 2021-06-08 ENCOUNTER — MYC REFILL (OUTPATIENT)
Dept: FAMILY MEDICINE | Facility: CLINIC | Age: 56
End: 2021-06-08

## 2021-06-08 DIAGNOSIS — F90.0 ATTENTION DEFICIT HYPERACTIVITY DISORDER (ADHD), PREDOMINANTLY INATTENTIVE TYPE: ICD-10-CM

## 2021-06-08 RX ORDER — DEXMETHYLPHENIDATE HYDROCHLORIDE 10 MG/1
10 CAPSULE, EXTENDED RELEASE ORAL DAILY
Qty: 30 CAPSULE | Refills: 0 | OUTPATIENT
Start: 2021-06-08

## 2021-06-08 RX ORDER — DEXMETHYLPHENIDATE HYDROCHLORIDE 10 MG/1
10 CAPSULE, EXTENDED RELEASE ORAL DAILY
Qty: 30 CAPSULE | Refills: 0 | Status: CANCELLED | OUTPATIENT
Start: 2021-06-08

## 2021-06-08 NOTE — TELEPHONE ENCOUNTER
Has prescriptions on file at pharmacy.  Pt notified via my chart to contact pharmacy.    Gianna BOLIVAR RN  EP Triage

## 2021-08-05 ENCOUNTER — MYC REFILL (OUTPATIENT)
Dept: FAMILY MEDICINE | Facility: CLINIC | Age: 56
End: 2021-08-05

## 2021-08-05 DIAGNOSIS — F90.0 ATTENTION DEFICIT HYPERACTIVITY DISORDER (ADHD), PREDOMINANTLY INATTENTIVE TYPE: ICD-10-CM

## 2021-08-05 NOTE — TELEPHONE ENCOUNTER
Dexmethylphenidate xr 10 mg       Last Written Prescription Date:  4/16/21  Last Fill Quantity: 30,   # refills: 0  Last Office Visit: 11/13/20 Jaron  Future Office visit:       Routing refill request to provider for review/approval because:  Drug not on the FMG, UMP or  Health refill protocol or controlled substance    [place] reviewed:  reviewed- no concerns on 8/5/21.  Sold on 7/7/21    [place]  [email or web address]    Gianna BOLIVAR RN  EP Triage

## 2021-08-06 RX ORDER — DEXMETHYLPHENIDATE HYDROCHLORIDE 10 MG/1
10 CAPSULE, EXTENDED RELEASE ORAL DAILY
Qty: 30 CAPSULE | Refills: 0 | Status: SHIPPED | OUTPATIENT
Start: 2021-08-06 | End: 2022-01-08

## 2021-08-06 NOTE — TELEPHONE ENCOUNTER
Due for office visit, only 1 month supply submitted.  No additional refills until seen in clinic.    Please call and inform pt to schedule a/an an annual exam with PCP.

## 2021-08-09 NOTE — TELEPHONE ENCOUNTER
Called and left a voicemail for the patient to call the clinic.  See note below.    Jayshree Corey on 8/9/2021 at 9:11 AM

## 2021-08-13 NOTE — TELEPHONE ENCOUNTER
Pt called back and was read note below. She is on vacation and will call to get scheduled once she returns. Thank you,   Brittaney Palacio

## 2021-08-25 DIAGNOSIS — F32.1 MODERATE SINGLE CURRENT EPISODE OF MAJOR DEPRESSIVE DISORDER (H): ICD-10-CM

## 2021-08-25 RX ORDER — FLUOXETINE 40 MG/1
CAPSULE ORAL
Qty: 90 CAPSULE | Refills: 0 | Status: SHIPPED | OUTPATIENT
Start: 2021-08-25 | End: 2021-11-19

## 2021-08-25 RX ORDER — BUPROPION HYDROCHLORIDE 300 MG/1
TABLET ORAL
Qty: 90 TABLET | Refills: 0 | Status: SHIPPED | OUTPATIENT
Start: 2021-08-25 | End: 2021-11-19

## 2021-08-25 NOTE — TELEPHONE ENCOUNTER
Failed protocol.  please route to  team if patient needs an appointment     Julisa DASILVARN BSN  [facility]  [phone]

## 2021-09-03 ENCOUNTER — OFFICE VISIT (OUTPATIENT)
Dept: FAMILY MEDICINE | Facility: CLINIC | Age: 56
End: 2021-09-03
Payer: COMMERCIAL

## 2021-09-03 VITALS
HEART RATE: 73 BPM | TEMPERATURE: 98.4 F | RESPIRATION RATE: 16 BRPM | DIASTOLIC BLOOD PRESSURE: 70 MMHG | HEIGHT: 66 IN | WEIGHT: 154 LBS | OXYGEN SATURATION: 98 % | SYSTOLIC BLOOD PRESSURE: 122 MMHG | BODY MASS INDEX: 24.75 KG/M2

## 2021-09-03 DIAGNOSIS — F90.0 ATTENTION DEFICIT HYPERACTIVITY DISORDER (ADHD), PREDOMINANTLY INATTENTIVE TYPE: ICD-10-CM

## 2021-09-03 DIAGNOSIS — F32.1 MODERATE SINGLE CURRENT EPISODE OF MAJOR DEPRESSIVE DISORDER (H): ICD-10-CM

## 2021-09-03 DIAGNOSIS — Z00.00 ROUTINE GENERAL MEDICAL EXAMINATION AT A HEALTH CARE FACILITY: Primary | ICD-10-CM

## 2021-09-03 DIAGNOSIS — Z23 NEED FOR VACCINATION: ICD-10-CM

## 2021-09-03 PROCEDURE — 90471 IMMUNIZATION ADMIN: CPT | Performed by: INTERNAL MEDICINE

## 2021-09-03 PROCEDURE — 99396 PREV VISIT EST AGE 40-64: CPT | Mod: 25 | Performed by: INTERNAL MEDICINE

## 2021-09-03 PROCEDURE — 90750 HZV VACC RECOMBINANT IM: CPT | Performed by: INTERNAL MEDICINE

## 2021-09-03 PROCEDURE — 99214 OFFICE O/P EST MOD 30 MIN: CPT | Mod: 25 | Performed by: INTERNAL MEDICINE

## 2021-09-03 ASSESSMENT — ENCOUNTER SYMPTOMS
PALPITATIONS: 0
NERVOUS/ANXIOUS: 0
DIZZINESS: 0
FEVER: 0
SORE THROAT: 0
ABDOMINAL PAIN: 0
CHILLS: 0
HEMATURIA: 0
WEAKNESS: 0
HEMATOCHEZIA: 0
EYE PAIN: 0
ARTHRALGIAS: 0
CONSTIPATION: 0
COUGH: 0
BREAST MASS: 0
MYALGIAS: 1
DIARRHEA: 0
SHORTNESS OF BREATH: 0
HEARTBURN: 0
PARESTHESIAS: 0
FREQUENCY: 0
JOINT SWELLING: 0
DYSURIA: 0
HEADACHES: 0

## 2021-09-03 ASSESSMENT — MIFFLIN-ST. JEOR: SCORE: 1311.29

## 2021-09-03 ASSESSMENT — PAIN SCALES - GENERAL: PAINLEVEL: NO PAIN (0)

## 2021-09-03 NOTE — PROGRESS NOTES
SUBJECTIVE:   CC: Shanique Delgado is an 55 year old woman who presents for preventive health visit.     Shanique lives with her  and step kids, also has 3 grown kids.  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  Her son is doing better, working from home.        Mental health - symptoms decently controlled. About the same as usual.  Would like to continue with current medications - fluoxetine, wellbutrin, and focalin    Patient has been advised of split billing requirements and indicates understanding: Yes  Healthy Habits:     Getting at least 3 servings of Calcium per day:  Yes    Bi-annual eye exam:  NO    Dental care twice a year:  Yes    Sleep apnea or symptoms of sleep apnea:  None    Diet:  Regular (no restrictions)    Frequency of exercise:  4-5 days/week    Duration of exercise:  45-60 minutes    Taking medications regularly:  Yes    PHQ-2 Total Score: 2        Today's PHQ-2 Score:   PHQ-2 ( 1999 Pfizer) 9/3/2021   Q1: Little interest or pleasure in doing things 1   Q2: Feeling down, depressed or hopeless 1   PHQ-2 Score 2   Q1: Little interest or pleasure in doing things Several days   Q2: Feeling down, depressed or hopeless Several days   PHQ-2 Score 2       Abuse: Current or Past (Physical, Sexual or Emotional) - No  Do you feel safe in your environment? YES    Have you ever done Advance Care Planning? (For example, a Health Directive, POLST, or a discussion with a medical provider or your loved ones about your wishes): No     Social History     Tobacco Use     Smoking status: Never Smoker     Smokeless tobacco: Never Used   Substance Use Topics     Alcohol use: Yes     Alcohol/week: 0.0 standard drinks     Comment: few times per week          Alcohol Use 9/3/2021   Prescreen: >3 drinks/day or >7 drinks/week? No   Prescreen: >3 drinks/day or >7 drinks/week? -     Reviewed orders with patient.  Reviewed health maintenance and updated orders accordingly - Yes  Patient Active Problem List   Diagnosis     Anxiety  state     IUD (intrauterine device) in place     Moderate single current episode of major depressive disorder (H)     Attention deficit hyperactivity disorder (ADHD), predominantly inattentive type     Controlled substance agreement signed     Mammogram declined     Past Surgical History:   Procedure Laterality Date     CHOLECYSTECTOMY         Social History     Tobacco Use     Smoking status: Never Smoker     Smokeless tobacco: Never Used   Substance Use Topics     Alcohol use: Yes     Alcohol/week: 0.0 standard drinks     Comment: few times per week      Family History   Problem Relation Age of Onset     Breast Cancer Mother         age 48-      Breast Cancer Maternal Grandmother         age 50's-          Current Outpatient Medications   Medication Sig Dispense Refill     buPROPion (WELLBUTRIN XL) 300 MG 24 hr tablet TAKE 1 TABLET BY MOUTH EVERY MORNING 90 tablet 0     dexmethylphenidate (FOCALIN XR) 10 MG 24 hr capsule Take 1 capsule (10 mg) by mouth daily 30 capsule 0     FLUoxetine (PROZAC) 40 MG capsule TAKE 1 CAPSULE BY MOUTH EVERY DAY 90 capsule 0       Breast Cancer Screening:    FHS-7:   Breast CA Risk Assessment (FHS-7) 9/3/2021   Did any of your first-degree relatives have breast or ovarian cancer? Yes   Did any of your relatives have bilateral breast cancer? Unknown   Did any man in your family have breast cancer? No   Did any woman in your family have breast and ovarian cancer? No   Did any woman in your family have breast cancer before age 50 y? Yes   Do you have 2 or more relatives with breast and/or ovarian cancer? Yes   Do you have 2 or more relatives with breast and/or bowel cancer? No        Mammogram Screening: Recommended annual mammography  Pertinent mammograms are reviewed under the imaging tab.    History of abnormal Pap smear: NO - age 30-65 PAP every 5 years with negative HPV co-testing recommended  PAP / HPV Latest Ref Rng & Units 10/10/2019 2014   PAP (Historical) -  "OTHER-NIL, See Result NIL   HPV16 NEG:Negative Negative -   HPV18 NEG:Negative Negative -   HRHPV NEG:Negative Negative -     Reviewed and updated as needed this visit by clinical staff  Tobacco  Allergies  Meds              Reviewed and updated as needed this visit by Provider    Meds                 Review of Systems   Constitutional: Negative for chills and fever.   HENT: Negative for congestion, ear pain, hearing loss and sore throat.    Eyes: Positive for visual disturbance. Negative for pain.   Respiratory: Negative for cough and shortness of breath.    Cardiovascular: Negative for chest pain, palpitations and peripheral edema.   Gastrointestinal: Negative for abdominal pain, constipation, diarrhea, heartburn and hematochezia.   Breasts:  Negative for tenderness, breast mass and discharge.   Genitourinary: Negative for dysuria, frequency, genital sores, hematuria, pelvic pain, urgency, vaginal bleeding and vaginal discharge.   Musculoskeletal: Positive for myalgias. Negative for arthralgias and joint swelling.   Skin: Negative for rash.   Neurological: Negative for dizziness, weakness, headaches and paresthesias.   Psychiatric/Behavioral: Positive for mood changes. The patient is not nervous/anxious.         OBJECTIVE:   /70   Pulse 73   Temp 98.4  F (36.9  C) (Tympanic)   Resp 16   Ht 1.67 m (5' 5.75\")   Wt 69.9 kg (154 lb)   SpO2 98%   BMI 25.05 kg/m    Physical Exam  GENERAL APPEARANCE: healthy, alert and no distress  EYES: Eyes grossly normal to inspection, PERRL and conjunctivae and sclerae normal  HENT: ear canals and TM's normal, mouth without ulcers or lesions, oropharynx clear and oral mucous membranes moist  NECK: no adenopathy, no asymmetry, masses, or scars and thyroid normal to palpation  RESP: lungs clear to auscultation - no rales, rhonchi or wheezes  CV: regular rate and rhythm, normal S1 S2, no S3 or S4, no murmur, click or rub, no peripheral edema and peripheral pulses " "strong  ABDOMEN: soft, nontender, no hepatosplenomegaly, no masses and bowel sounds normal  MS: no musculoskeletal defects are noted and gait is age appropriate without ataxia  SKIN: no suspicious lesions or rashes  NEURO: Normal strength and tone, sensory exam grossly normal, mentation intact and speech normal  PSYCH: mentation appears normal and affect normal/bright    Diagnostic Test Results:  Labs reviewed in Epic    ASSESSMENT/PLAN:   (Z00.00) Routine general medical examination at a health care facility  (primary encounter diagnosis)  Comment:   Mammogram declined, she does home breast exams   Pap UTD  Colonoscopy UTD  Lipid and BG done 2019, deferred today       (F90.0) Attention deficit hyperactivity disorder (ADHD), predominantly inattentive type  Comment: well controlled on focalin 10mg   Plan: dexmethylphenidate (FOCALIN XR) 10 MG 24 hr         capsule, dexmethylphenidate (FOCALIN XR) 10 MG         24 hr capsule, dexmethylphenidate (FOCALIN XR)         10 MG 24 hr capsule            (F32.1) Moderate single current episode of major depressive disorder (H)  Comment: moderately well controlled on fluoxetine and wellbutrin.  She does not need refill today     (Z23) Need for vaccination  Comment:   Plan: SHINGRIX [[id number]]              COUNSELING:  Reviewed preventive health counseling, as reflected in patient instructions       Regular exercise       Healthy diet/nutrition       Osteoporosis prevention/bone health    Estimated body mass index is 25.05 kg/m  as calculated from the following:    Height as of this encounter: 1.67 m (5' 5.75\").    Weight as of this encounter: 69.9 kg (154 lb).        She reports that she has never smoked. She has never used smokeless tobacco.      Counseling Resources:  ATP IV Guidelines  Pooled Cohorts Equation Calculator  Breast Cancer Risk Calculator  BRCA-Related Cancer Risk Assessment: FHS-7 Tool  FRAX Risk Assessment  ICSI Preventive Guidelines  Dietary Guidelines for " Americans, 2010  USDA's MyPlate  ASA Prophylaxis  Lung CA Screening    Ramandeep Salmeron MD  [facility]

## 2021-09-04 RX ORDER — DEXMETHYLPHENIDATE HYDROCHLORIDE 10 MG/1
10 CAPSULE, EXTENDED RELEASE ORAL DAILY
Qty: 30 CAPSULE | Refills: 0 | Status: SHIPPED | OUTPATIENT
Start: 2021-11-05 | End: 2021-12-05

## 2021-09-04 RX ORDER — DEXMETHYLPHENIDATE HYDROCHLORIDE 10 MG/1
10 CAPSULE, EXTENDED RELEASE ORAL DAILY
Qty: 30 CAPSULE | Refills: 0 | Status: SHIPPED | OUTPATIENT
Start: 2021-09-04 | End: 2021-10-04

## 2021-09-04 RX ORDER — DEXMETHYLPHENIDATE HYDROCHLORIDE 10 MG/1
10 CAPSULE, EXTENDED RELEASE ORAL DAILY
Qty: 30 CAPSULE | Refills: 0 | Status: SHIPPED | OUTPATIENT
Start: 2021-10-05 | End: 2021-11-04

## 2022-03-01 ENCOUNTER — MYC REFILL (OUTPATIENT)
Dept: FAMILY MEDICINE | Facility: CLINIC | Age: 57
End: 2022-03-01
Payer: COMMERCIAL

## 2022-03-01 DIAGNOSIS — F90.0 ATTENTION DEFICIT HYPERACTIVITY DISORDER (ADHD), PREDOMINANTLY INATTENTIVE TYPE: ICD-10-CM

## 2022-03-01 NOTE — TELEPHONE ENCOUNTER
Dexmethylphenidate 10 mg      Last Written Prescription Date:  1/10/22  Last Fill Quantity: 30,   # refills: 0  Last Office Visit: 9/3/21 Ditty  Future Office visit:       Routing refill request to provider for review/approval because:  Drug not on the FMG, P or [facility] refill protocol or controlled substance    Gianna BOLIVAR RN  EP Triage

## 2022-03-02 RX ORDER — DEXMETHYLPHENIDATE HYDROCHLORIDE 10 MG/1
10 CAPSULE, EXTENDED RELEASE ORAL DAILY
Qty: 30 CAPSULE | Refills: 0 | Status: SHIPPED | OUTPATIENT
Start: 2022-03-02 | End: 2022-12-21

## 2022-04-04 ENCOUNTER — PATIENT OUTREACH (OUTPATIENT)
Dept: FAMILY MEDICINE | Facility: CLINIC | Age: 57
End: 2022-04-04
Payer: COMMERCIAL

## 2022-04-04 NOTE — TELEPHONE ENCOUNTER
Patient Quality Outreach      Summary:    Patient has the following on her problem list/HM:   Immunizations     No immunizations due        Patient is due/failing the following:   Breast Cancer Screening - Mammogram  Depression  -  PHQ-9 Needed  Immunizations  -  Covid and Zoster    Type of outreach:    Sent [employer] message.    Questions for provider review:    None                                                                                                                                     Xin WYATT CMA       Postponed for 2 weeks.

## 2022-04-18 NOTE — TELEPHONE ENCOUNTER
Patient Quality Outreach 2nd Attempt      Summary:    Type of outreach:    Phone, left message for patient/parent to call back.    Next Steps:  Reach out within 90 days via Phone.    Max number of attempts reached: Yes. Will try again in 90 days if patient still on fail list.    Questions for provider review:    None                                                                                                                    Xin WYATT CMA       Closing encounter.

## 2022-06-08 ENCOUNTER — OFFICE VISIT (OUTPATIENT)
Dept: FAMILY MEDICINE | Facility: CLINIC | Age: 57
End: 2022-06-08
Payer: COMMERCIAL

## 2022-06-08 VITALS
SYSTOLIC BLOOD PRESSURE: 118 MMHG | HEART RATE: 73 BPM | BODY MASS INDEX: 26.2 KG/M2 | HEIGHT: 66 IN | WEIGHT: 163 LBS | DIASTOLIC BLOOD PRESSURE: 72 MMHG | OXYGEN SATURATION: 97 % | TEMPERATURE: 97 F

## 2022-06-08 DIAGNOSIS — F90.0 ATTENTION DEFICIT HYPERACTIVITY DISORDER (ADHD), PREDOMINANTLY INATTENTIVE TYPE: Primary | ICD-10-CM

## 2022-06-08 DIAGNOSIS — E78.2 MIXED HYPERLIPIDEMIA: ICD-10-CM

## 2022-06-08 DIAGNOSIS — F32.1 MODERATE SINGLE CURRENT EPISODE OF MAJOR DEPRESSIVE DISORDER (H): ICD-10-CM

## 2022-06-08 PROCEDURE — 36415 COLL VENOUS BLD VENIPUNCTURE: CPT | Performed by: FAMILY MEDICINE

## 2022-06-08 PROCEDURE — 80053 COMPREHEN METABOLIC PANEL: CPT | Performed by: FAMILY MEDICINE

## 2022-06-08 PROCEDURE — 96127 BRIEF EMOTIONAL/BEHAV ASSMT: CPT | Performed by: FAMILY MEDICINE

## 2022-06-08 PROCEDURE — 83721 ASSAY OF BLOOD LIPOPROTEIN: CPT | Performed by: FAMILY MEDICINE

## 2022-06-08 PROCEDURE — 84443 ASSAY THYROID STIM HORMONE: CPT | Performed by: FAMILY MEDICINE

## 2022-06-08 PROCEDURE — 99214 OFFICE O/P EST MOD 30 MIN: CPT | Performed by: FAMILY MEDICINE

## 2022-06-08 RX ORDER — BUPROPION HYDROCHLORIDE 300 MG/1
TABLET ORAL
Qty: 90 TABLET | Refills: 3 | Status: SHIPPED | OUTPATIENT
Start: 2022-06-08 | End: 2023-06-01

## 2022-06-08 RX ORDER — FLUOXETINE 40 MG/1
CAPSULE ORAL
Qty: 90 CAPSULE | Refills: 3 | Status: SHIPPED | OUTPATIENT
Start: 2022-06-08 | End: 2023-06-01

## 2022-06-08 ASSESSMENT — ANXIETY QUESTIONNAIRES
7. FEELING AFRAID AS IF SOMETHING AWFUL MIGHT HAPPEN: SEVERAL DAYS
1. FEELING NERVOUS, ANXIOUS, OR ON EDGE: MORE THAN HALF THE DAYS
GAD7 TOTAL SCORE: 12
3. WORRYING TOO MUCH ABOUT DIFFERENT THINGS: MORE THAN HALF THE DAYS
2. NOT BEING ABLE TO STOP OR CONTROL WORRYING: MORE THAN HALF THE DAYS
7. FEELING AFRAID AS IF SOMETHING AWFUL MIGHT HAPPEN: SEVERAL DAYS
4. TROUBLE RELAXING: MORE THAN HALF THE DAYS
GAD7 TOTAL SCORE: 12
5. BEING SO RESTLESS THAT IT IS HARD TO SIT STILL: SEVERAL DAYS
GAD7 TOTAL SCORE: 12
6. BECOMING EASILY ANNOYED OR IRRITABLE: MORE THAN HALF THE DAYS
8. IF YOU CHECKED OFF ANY PROBLEMS, HOW DIFFICULT HAVE THESE MADE IT FOR YOU TO DO YOUR WORK, TAKE CARE OF THINGS AT HOME, OR GET ALONG WITH OTHER PEOPLE?: SOMEWHAT DIFFICULT

## 2022-06-08 ASSESSMENT — PATIENT HEALTH QUESTIONNAIRE - PHQ9
SUM OF ALL RESPONSES TO PHQ QUESTIONS 1-9: 5
SUM OF ALL RESPONSES TO PHQ QUESTIONS 1-9: 5
10. IF YOU CHECKED OFF ANY PROBLEMS, HOW DIFFICULT HAVE THESE PROBLEMS MADE IT FOR YOU TO DO YOUR WORK, TAKE CARE OF THINGS AT HOME, OR GET ALONG WITH OTHER PEOPLE: SOMEWHAT DIFFICULT

## 2022-06-08 ASSESSMENT — PAIN SCALES - GENERAL: PAINLEVEL: NO PAIN (0)

## 2022-06-08 NOTE — PROGRESS NOTES
"  Assessment & Plan     Moderate single current episode of major depressive disorder (H)  Stable, has no clinical sx of worsening, will have her to check lab and continue taking current dose of meds,   - Comprehensive metabolic panel (BMP + Alb, Alk Phos, ALT, AST, Total. Bili, TP); Future  - TSH with free T4 reflex; Future  - LDL cholesterol direct; Future  - buPROPion (WELLBUTRIN XL) 300 MG 24 hr tablet; TAKE 1 TABLET BY MOUTH EVERY DAY IN THE MORNING  - FLUoxetine (PROZAC) 40 MG capsule; TAKE 1 CAPSULE BY MOUTH EVERY DAY  - Comprehensive metabolic panel (BMP + Alb, Alk Phos, ALT, AST, Total. Bili, TP)  - TSH with free T4 reflex  - LDL cholesterol direct    Attention deficit hyperactivity disorder (ADHD), predominantly inattentive type  Has been stable, keep monitoring, will recheck in 6 months   - Comprehensive metabolic panel (BMP + Alb, Alk Phos, ALT, AST, Total. Bili, TP); Future  - TSH with free T4 reflex; Future  - LDL cholesterol direct; Future         BMI:   Estimated body mass index is 26.51 kg/m  as calculated from the following:    Height as of this encounter: 1.67 m (5' 5.75\").    Weight as of this encounter: 73.9 kg (163 lb).       FUTURE APPOINTMENTS:       - Follow-up visit in 6 months     No follow-ups on file.    Sacha Lyon MD  [facility] Bay is a 56 year old who presents for the following health issues     History of Present Illness       Mental Health Follow-up:  Patient presents to follow-up on Depression & Anxiety.Patient's depression since last visit has been:  No change  The patient is not having other symptoms associated with depression.  Patient's anxiety since last visit has been:  No change  The patient is not having other symptoms associated with anxiety.  Any significant life events: No  Patient is not feeling anxious or having panic attacks.  Patient has no concerns about alcohol or drug use.    She eats 2-3 servings of fruits and " "vegetables daily.She consumes 0 sweetened beverage(s) daily.She exercises with enough effort to increase her heart rate 60 or more minutes per day.  She exercises with enough effort to increase her heart rate 5 days per week.   She is taking medications regularly.    Today's PHQ-9         PHQ-9 Total Score: 5    PHQ-9 Q9 Thoughts of better off dead/self-harm past 2 weeks :   Not at all    How difficult have these problems made it for you to do your work, take care of things at home, or get along with other people: Somewhat difficult  Today's NICOLAS-7 Score: 12         Review of Systems   Constitutional, HEENT, cardiovascular, pulmonary, gi and gu systems are negative, except as otherwise noted.      Objective    /72   Pulse 73   Temp 97  F (36.1  C) (Tympanic)   Ht 1.67 m (5' 5.75\")   Wt 73.9 kg (163 lb)   SpO2 97%   BMI 26.51 kg/m    Body mass index is 26.51 kg/m .  Physical Exam   GENERAL: healthy, alert and no distress  NECK: no adenopathy, no asymmetry, masses, or scars and thyroid normal to palpation  RESP: lungs clear to auscultation - no rales, rhonchi or wheezes  CV: regular rate and rhythm, normal S1 S2, no S3 or S4, no murmur, click or rub, no peripheral edema and peripheral pulses strong  ABDOMEN: soft, nontender, no hepatosplenomegaly, no masses and bowel sounds normal  MS: no gross musculoskeletal defects noted, no edema                "

## 2022-06-09 LAB
ALBUMIN SERPL-MCNC: 3.9 G/DL (ref 3.4–5)
ALP SERPL-CCNC: 58 U/L (ref 40–150)
ALT SERPL W P-5'-P-CCNC: 39 U/L (ref 0–50)
ANION GAP SERPL CALCULATED.3IONS-SCNC: 6 MMOL/L (ref 3–14)
AST SERPL W P-5'-P-CCNC: 20 U/L (ref 0–45)
BILIRUB SERPL-MCNC: 0.5 MG/DL (ref 0.2–1.3)
BUN SERPL-MCNC: 15 MG/DL (ref 7–30)
CALCIUM SERPL-MCNC: 9.8 MG/DL (ref 8.5–10.1)
CHLORIDE BLD-SCNC: 106 MMOL/L (ref 94–109)
CO2 SERPL-SCNC: 26 MMOL/L (ref 20–32)
CREAT SERPL-MCNC: 0.83 MG/DL (ref 0.52–1.04)
GFR SERPL CREATININE-BSD FRML MDRD: 83 ML/MIN/1.73M2
GLUCOSE BLD-MCNC: 94 MG/DL (ref 70–99)
LDLC SERPL CALC-MCNC: 208 MG/DL
POTASSIUM BLD-SCNC: 4.2 MMOL/L (ref 3.4–5.3)
PROT SERPL-MCNC: 7.3 G/DL (ref 6.8–8.8)
SODIUM SERPL-SCNC: 138 MMOL/L (ref 133–144)
TSH SERPL DL<=0.005 MIU/L-ACNC: 2.59 MU/L (ref 0.4–4)

## 2022-06-09 RX ORDER — ATORVASTATIN CALCIUM 20 MG/1
20 TABLET, FILM COATED ORAL DAILY
Qty: 90 TABLET | Refills: 1 | Status: SHIPPED | OUTPATIENT
Start: 2022-06-09 | End: 2023-05-05

## 2022-10-16 ENCOUNTER — HEALTH MAINTENANCE LETTER (OUTPATIENT)
Age: 57
End: 2022-10-16

## 2023-01-13 ENCOUNTER — VIRTUAL VISIT (OUTPATIENT)
Dept: FAMILY MEDICINE | Facility: CLINIC | Age: 58
End: 2023-01-13

## 2023-01-13 DIAGNOSIS — F32.1 MODERATE SINGLE CURRENT EPISODE OF MAJOR DEPRESSIVE DISORDER (H): ICD-10-CM

## 2023-01-13 DIAGNOSIS — E78.2 MIXED HYPERLIPIDEMIA: Primary | ICD-10-CM

## 2023-01-13 DIAGNOSIS — F90.0 ATTENTION DEFICIT HYPERACTIVITY DISORDER (ADHD), PREDOMINANTLY INATTENTIVE TYPE: ICD-10-CM

## 2023-01-13 PROCEDURE — 99214 OFFICE O/P EST MOD 30 MIN: CPT | Mod: GT | Performed by: FAMILY MEDICINE

## 2023-01-13 RX ORDER — DEXMETHYLPHENIDATE HYDROCHLORIDE 10 MG/1
10 CAPSULE, EXTENDED RELEASE ORAL DAILY
Qty: 30 CAPSULE | Refills: 0 | Status: SHIPPED | OUTPATIENT
Start: 2023-01-13 | End: 2023-03-08

## 2023-01-13 ASSESSMENT — ANXIETY QUESTIONNAIRES
1. FEELING NERVOUS, ANXIOUS, OR ON EDGE: SEVERAL DAYS
IF YOU CHECKED OFF ANY PROBLEMS ON THIS QUESTIONNAIRE, HOW DIFFICULT HAVE THESE PROBLEMS MADE IT FOR YOU TO DO YOUR WORK, TAKE CARE OF THINGS AT HOME, OR GET ALONG WITH OTHER PEOPLE: SOMEWHAT DIFFICULT
GAD7 TOTAL SCORE: 10
2. NOT BEING ABLE TO STOP OR CONTROL WORRYING: MORE THAN HALF THE DAYS
3. WORRYING TOO MUCH ABOUT DIFFERENT THINGS: MORE THAN HALF THE DAYS
6. BECOMING EASILY ANNOYED OR IRRITABLE: MORE THAN HALF THE DAYS
7. FEELING AFRAID AS IF SOMETHING AWFUL MIGHT HAPPEN: SEVERAL DAYS
GAD7 TOTAL SCORE: 10
5. BEING SO RESTLESS THAT IT IS HARD TO SIT STILL: NOT AT ALL

## 2023-01-13 NOTE — PROGRESS NOTES
"Shanique is a 57 year old who is being evaluated via a billable video visit.      How would you like to obtain your AVS? MyChart  If the video visit is dropped, the invitation should be resent by: Text to cell phone: [phone]  Will anyone else be joining your video visit? No          Assessment & Plan     Moderate single current episode of major depressive disorder (H)  Stable with current dose of meds, will keep her on the combo of Wellbutrin and fluoxetine, will recheck in 6 months, encouraged her to take the Wellbutrin am and Fluoxetine pm     Attention deficit hyperactivity disorder (ADHD), predominantly inattentive type  Has been stable with current dose of meds, will keep her on the current regimen with weekend holiday  - dexmethylphenidate (FOCALIN XR) 10 MG 24 hr capsule; Take 1 capsule (10 mg) by mouth daily    Mixed hyperlipidemia  Has bee off of statin and work on life style modification   Will have her to check lab for further evaluation and consider reinstating med if indicated   - Lipid panel reflex to direct LDL Fasting; Future  - Hepatic panel (Albumin, ALT, AST, Bili, Alk Phos, TP); Future           BMI:   Estimated body mass index is 26.51 kg/m  as calculated from the following:    Height as of 6/8/22: 1.67 m (5' 5.75\").    Weight as of 6/8/22: 73.9 kg (163 lb).       FUTURE APPOINTMENTS:       - Follow-up visit in 6 months     Return in about 6 months (around 7/13/2023) for Physical Exam, Lab Work, med check.    Sacha Lyon MD  [facility]    Jenna Bay is a 57 year old presenting for the following health issues:  No chief complaint on file.      HPI     Medication Followup of Bupropion     Taking Medication as prescribed: yes    Side Effects:  None    Medication Helping Symptoms:  yes    Hyperlipidemia Follow-Up      Are you regularly taking any medication or supplement to lower your cholesterol?   No    Are you having muscle aches or other side effects that " you think could be caused by your cholesterol lowering medication?  No    Depression and Anxiety Follow-Up    How are you doing with your depression since your last visit? No change    How are you doing with your anxiety since your last visit?  No change    Are you having other symptoms that might be associated with depression or anxiety? No    Have you had a significant life event? No     Do you have any concerns with your use of alcohol or other drugs? No    Social History     Tobacco Use     Smoking status: Never     Smokeless tobacco: Never   Substance Use Topics     Alcohol use: Yes     Alcohol/week: 0.0 standard drinks     Comment: few times per week      Drug use: No     PHQ 8/25/2021 6/8/2022 1/13/2023   PHQ-9 Total Score 5 5 5   Q9: Thoughts of better off dead/self-harm past 2 weeks Not at all Not at all Not at all     NICOLAS-7 SCORE 8/25/2021 6/8/2022 1/13/2023   Total Score - - -   Total Score 6 (mild anxiety) 12 (moderate anxiety) -   Total Score 6 12 10         Suicide Assessment Five-step Evaluation and Treatment (SAFE-T)      How many servings of fruits and vegetables do you eat daily?  2-3    On average, how many sweetened beverages do you drink each day (Examples: soda, juice, sweet tea, etc.  Do NOT count diet or artificially sweetened beverages)?   6    How many days per week do you exercise enough to make your heart beat faster? 5    How many minutes a day do you exercise enough to make your heart beat faster? 60 or more    How many days per week do you miss taking your medication? 0      Review of Systems   Constitutional, HEENT, cardiovascular, pulmonary, gi and gu systems are negative, except as otherwise noted.      Objective           Vitals:  No vitals were obtained today due to virtual visit.    Physical Exam   GENERAL: Healthy, alert and no distress  EYES: Eyes grossly normal to inspection.  No discharge or erythema, or obvious scleral/conjunctival abnormalities.  RESP: No audible wheeze,  cough, or visible cyanosis.  No visible retractions or increased work of breathing.    SKIN: Visible skin clear. No significant rash, abnormal pigmentation or lesions.  NEURO: Cranial nerves grossly intact.  Mentation and speech appropriate for age.  PSYCH: Mentation appears normal, affect normal/bright, judgement and insight intact, normal speech and appearance well-groomed.                Video-Visit Details    Type of service:  Video Visit     Originating Location (pt. Location): Home    Distant Location (provider location):  On-site  Platform used for Video Visit: [employer]    Video startin:55  Video finishin:26

## 2023-02-13 ENCOUNTER — LAB (OUTPATIENT)
Dept: LAB | Facility: CLINIC | Age: 58
End: 2023-02-13
Payer: COMMERCIAL

## 2023-02-13 DIAGNOSIS — E78.2 MIXED HYPERLIPIDEMIA: ICD-10-CM

## 2023-02-13 DIAGNOSIS — E78.2 MIXED HYPERLIPIDEMIA: Primary | ICD-10-CM

## 2023-02-13 LAB
ALBUMIN SERPL BCG-MCNC: 4.6 G/DL (ref 3.5–5.2)
ALP SERPL-CCNC: 56 U/L (ref 35–104)
ALT SERPL W P-5'-P-CCNC: 69 U/L (ref 10–35)
AST SERPL W P-5'-P-CCNC: 42 U/L (ref 10–35)
BILIRUB DIRECT SERPL-MCNC: <0.2 MG/DL (ref 0–0.3)
BILIRUB SERPL-MCNC: 0.5 MG/DL
CHOLEST SERPL-MCNC: 303 MG/DL
HDLC SERPL-MCNC: 53 MG/DL
LDLC SERPL CALC-MCNC: 226 MG/DL
NONHDLC SERPL-MCNC: 250 MG/DL
PROT SERPL-MCNC: 7.5 G/DL (ref 6.4–8.3)
TRIGL SERPL-MCNC: 118 MG/DL

## 2023-02-13 PROCEDURE — 80076 HEPATIC FUNCTION PANEL: CPT

## 2023-02-13 PROCEDURE — 80061 LIPID PANEL: CPT

## 2023-02-13 PROCEDURE — 36415 COLL VENOUS BLD VENIPUNCTURE: CPT

## 2023-02-13 RX ORDER — ROSUVASTATIN CALCIUM 20 MG/1
20 TABLET, COATED ORAL DAILY
Qty: 90 TABLET | Refills: 1 | Status: SHIPPED | OUTPATIENT
Start: 2023-02-13 | End: 2023-08-21

## 2023-02-20 ENCOUNTER — TELEPHONE (OUTPATIENT)
Dept: FAMILY MEDICINE | Facility: CLINIC | Age: 58
End: 2023-02-20

## 2023-02-20 NOTE — TELEPHONE ENCOUNTER
Needs of attention regarding:  -Breast Cancer Screening  -Wellness (Physical) Visit     Health Maintenance Topics with due status: Overdue       Topic Date Due    ADVANCE CARE PLANNING Never done    MAMMO SCREENING 12/15/2007    ZOSTER IMMUNIZATION 10/29/2021    YEARLY PREVENTIVE VISIT 09/03/2022    COVID-19 Vaccine 09/04/2022       Communication:  Patient called - message left

## 2023-03-06 ENCOUNTER — MYC MEDICAL ADVICE (OUTPATIENT)
Dept: FAMILY MEDICINE | Facility: CLINIC | Age: 58
End: 2023-03-06
Payer: COMMERCIAL

## 2023-03-06 DIAGNOSIS — R11.2 NAUSEA AND VOMITING, UNSPECIFIED VOMITING TYPE: Primary | ICD-10-CM

## 2023-03-07 RX ORDER — ONDANSETRON 4 MG/1
4 TABLET, FILM COATED ORAL EVERY 8 HOURS PRN
Qty: 12 TABLET | Refills: 0 | Status: SHIPPED | OUTPATIENT
Start: 2023-03-07 | End: 2023-05-05

## 2023-03-07 NOTE — TELEPHONE ENCOUNTER
Please have her to take off of statin and increase liquid intake. I will also send zofran to pharmacy. She can take it as needed for nausea, and updated us in 1 week      thx

## 2023-03-07 NOTE — TELEPHONE ENCOUNTER
Please see [employer] message and advise.     Patient is taking rosuvastatin 20 mg daily and believes that she is having side effects from the medication. Should patient continue to take her medication?    Maritza MARISCAL RN  [facility] Triage Team

## 2023-03-07 NOTE — TELEPHONE ENCOUNTER
Patient notified of provider's response via [employer].    Maritza MARISCAL RN  [facility] Triage Team

## 2023-03-08 ENCOUNTER — MYC REFILL (OUTPATIENT)
Dept: FAMILY MEDICINE | Facility: CLINIC | Age: 58
End: 2023-03-08
Payer: COMMERCIAL

## 2023-03-08 DIAGNOSIS — F90.0 ATTENTION DEFICIT HYPERACTIVITY DISORDER (ADHD), PREDOMINANTLY INATTENTIVE TYPE: ICD-10-CM

## 2023-03-08 RX ORDER — DEXMETHYLPHENIDATE HYDROCHLORIDE 10 MG/1
10 CAPSULE, EXTENDED RELEASE ORAL DAILY
Qty: 30 CAPSULE | Refills: 0 | Status: SHIPPED | OUTPATIENT
Start: 2023-03-08 | End: 2023-04-17

## 2023-03-26 ENCOUNTER — HEALTH MAINTENANCE LETTER (OUTPATIENT)
Age: 58
End: 2023-03-26

## 2023-04-17 ENCOUNTER — MYC REFILL (OUTPATIENT)
Dept: FAMILY MEDICINE | Facility: CLINIC | Age: 58
End: 2023-04-17
Payer: COMMERCIAL

## 2023-04-17 DIAGNOSIS — F90.0 ATTENTION DEFICIT HYPERACTIVITY DISORDER (ADHD), PREDOMINANTLY INATTENTIVE TYPE: ICD-10-CM

## 2023-04-17 RX ORDER — DEXMETHYLPHENIDATE HYDROCHLORIDE 10 MG/1
10 CAPSULE, EXTENDED RELEASE ORAL DAILY
Qty: 30 CAPSULE | Refills: 0 | Status: SHIPPED | OUTPATIENT
Start: 2023-04-17 | End: 2023-05-21

## 2023-05-05 ENCOUNTER — OFFICE VISIT (OUTPATIENT)
Dept: FAMILY MEDICINE | Facility: CLINIC | Age: 58
End: 2023-05-05
Payer: COMMERCIAL

## 2023-05-05 VITALS
HEIGHT: 65 IN | SYSTOLIC BLOOD PRESSURE: 120 MMHG | WEIGHT: 152.6 LBS | OXYGEN SATURATION: 97 % | BODY MASS INDEX: 25.43 KG/M2 | TEMPERATURE: 98.4 F | DIASTOLIC BLOOD PRESSURE: 74 MMHG | HEART RATE: 79 BPM | RESPIRATION RATE: 18 BRPM

## 2023-05-05 DIAGNOSIS — Z12.11 SCREEN FOR COLON CANCER: ICD-10-CM

## 2023-05-05 DIAGNOSIS — Z23 NEED FOR SHINGLES VACCINE: ICD-10-CM

## 2023-05-05 DIAGNOSIS — E78.2 MIXED HYPERLIPIDEMIA: ICD-10-CM

## 2023-05-05 DIAGNOSIS — Z80.3 FAMILY HISTORY OF BREAST CANCER IN MOTHER: ICD-10-CM

## 2023-05-05 DIAGNOSIS — Z00.00 ROUTINE GENERAL MEDICAL EXAMINATION AT A HEALTH CARE FACILITY: Primary | ICD-10-CM

## 2023-05-05 PROCEDURE — 90471 IMMUNIZATION ADMIN: CPT | Performed by: PHYSICIAN ASSISTANT

## 2023-05-05 PROCEDURE — 90750 HZV VACC RECOMBINANT IM: CPT | Performed by: PHYSICIAN ASSISTANT

## 2023-05-05 PROCEDURE — 99213 OFFICE O/P EST LOW 20 MIN: CPT | Mod: 25 | Performed by: PHYSICIAN ASSISTANT

## 2023-05-05 PROCEDURE — 99396 PREV VISIT EST AGE 40-64: CPT | Mod: 25 | Performed by: PHYSICIAN ASSISTANT

## 2023-05-05 ASSESSMENT — ENCOUNTER SYMPTOMS
JOINT SWELLING: 0
EYE PAIN: 0
NERVOUS/ANXIOUS: 1
HEADACHES: 0
MYALGIAS: 0
ARTHRALGIAS: 0
DYSURIA: 0
NAUSEA: 0
PARESTHESIAS: 0
CONSTIPATION: 0
CHILLS: 0
SORE THROAT: 0
PALPITATIONS: 0
HEARTBURN: 0
COUGH: 0
ABDOMINAL PAIN: 0
FEVER: 0
FREQUENCY: 0
HEMATURIA: 0
HEMATOCHEZIA: 0
SHORTNESS OF BREATH: 0
DIZZINESS: 0
WEAKNESS: 0
DIARRHEA: 0

## 2023-05-05 ASSESSMENT — PAIN SCALES - GENERAL: PAINLEVEL: NO PAIN (0)

## 2023-05-05 NOTE — PATIENT INSTRUCTIONS
Shanique,    Great to meet you!    Think about a mammogram - let me know when you're ready and we can consider sending in a medication to help calm you.     Colonoscopy is due in December of this year    Your pap is due in October 2024    Try restarting the rosuvastatin at 10 mg (1/2 of 20 mg tablet) once daily and see how you do, update me on if you're tolerating this or not in a couple weeks. We can recheck cholesterol and liver tests about 2-3 months from when you start taking the medication    You were given your second and last shingles vaccine today       Preventive Health Recommendations  Female Ages 50 - 64    Yearly exam: See your health care provider every year in order to  Review health changes.   Discuss preventive care.    Review your medicines if your doctor has prescribed any.    Get a Pap test every three years (unless you have an abnormal result and your provider advises testing more often).  If you get Pap tests with HPV test, you only need to test every 5 years, unless you have an abnormal result.   You do not need a Pap test if your uterus was removed (hysterectomy) and you have not had cancer.  You should be tested each year for STDs (sexually transmitted diseases) if you're at risk.   Have a mammogram every 1 to 2 years.  Have a colonoscopy at age 50, or have a yearly FIT test (stool test). These exams screen for colon cancer.    Have a cholesterol test every 5 years, or more often if advised.  Have a diabetes test (fasting glucose) every three years. If you are at risk for diabetes, you should have this test more often.   If you are at risk for osteoporosis (brittle bone disease), think about having a bone density scan (DEXA).    Shots: Get a flu shot each year. Get a tetanus shot every 10 years.    Nutrition:   Eat at least 5 servings of fruits and vegetables each day.  Eat whole-grain bread, whole-wheat pasta and brown rice instead of white grains and rice.  Get adequate Calcium and Vitamin D.      Lifestyle  Exercise at least 150 minutes a week (30 minutes a day, 5 days a week). This will help you control your weight and prevent disease.  Limit alcohol to one drink per day.  No smoking.   Wear sunscreen to prevent skin cancer.   See your dentist every six months for an exam and cleaning.  See your eye doctor every 1 to 2 years.

## 2023-05-05 NOTE — PROGRESS NOTES
SUBJECTIVE:   CC: Shanique is an 57 year old who presents for preventive health visit.       5/5/2023     9:09 AM   Additional Questions   Roomed by Jesse   Accompanied by N/A   Patient has been advised of split billing requirements and indicates understanding: Yes  Healthy Habits:     Getting at least 3 servings of Calcium per day:  Yes    Bi-annual eye exam:  Yes    Dental care twice a year:  Yes    Sleep apnea or symptoms of sleep apnea:  None    Diet:  Low fat/cholesterol    Frequency of exercise:  4-5 days/week    Duration of exercise:  45-60 minutes    Taking medications regularly:  Yes    PHQ-2 Total Score: 2    Additional concerns today:  Yes    - Mammogram 2005, due. Family history of breast cancer in mother   - Pap 10/10/19 NIL, HPV negative, repeat 5 years (10/2024)  - Colonoscopy 12/12/13 - normal, will be due later this year in 12/2023  - Lipids elevated 2-2023, restarted on rosuvastatin 20 mg in 2/2023 but caused nausea, vomiting and muscle aches within a couple of weeks  - Immunizations: due for shingrix #2 and COVID bivalent     IUD removed 10/2020, had some light bleeding right after it was removed then no period since. Wondering if through menopause, no hot flashes etc.     Today's PHQ-2 Score:       5/5/2023     9:04 AM   PHQ-2 ( 1999 Pfizer)   Q1: Little interest or pleasure in doing things 1   Q2: Feeling down, depressed or hopeless 1   PHQ-2 Score 2   Q1: Little interest or pleasure in doing things Several days   Q2: Feeling down, depressed or hopeless Several days   PHQ-2 Score 2       Social History     Tobacco Use     Smoking status: Never     Smokeless tobacco: Never   Vaping Use     Vaping status: Not on file   Substance Use Topics     Alcohol use: Yes     Alcohol/week: 0.0 standard drinks of alcohol     Comment: few times per week          5/5/2023     9:04 AM   Alcohol Use   Prescreen: >3 drinks/day or >7 drinks/week? No     Reviewed orders with patient.  Reviewed health maintenance and  updated orders accordingly - Yes      Breast Cancer Screening:    FHS-7:       9/3/2021    11:30 AM 5/5/2023     9:04 AM   Breast CA Risk Assessment (FHS-7)   Did any of your first-degree relatives have breast or ovarian cancer? Yes Yes   Did any of your relatives have bilateral breast cancer? Unknown Unknown   Did any man in your family have breast cancer? No No   Did any woman in your family have breast and ovarian cancer? No Yes   Did any woman in your family have breast cancer before age 50 y? Yes Yes   Do you have 2 or more relatives with breast and/or ovarian cancer? Yes Yes   Do you have 2 or more relatives with breast and/or bowel cancer? No Yes       Mammogram Screening: Recommended mammography every 1-2 years with patient discussion and risk factor consideration  Pertinent mammograms are reviewed under the imaging tab.    History of abnormal Pap smear: NO - age 30-65 PAP every 5 years with negative HPV co-testing recommended      Latest Ref Rng & Units 10/10/2019     1:10 PM 10/10/2019     1:05 PM 12/18/2014    12:00 AM   PAP / HPV   PAP (Historical)   OTHER-NIL, See Result   NIL     HPV 16 DNA NEG^Negative Negative       HPV 18 DNA NEG^Negative Negative       Other HR HPV NEG^Negative Negative         Reviewed and updated as needed this visit by clinical staff   Tobacco  Allergies  Meds  Problems  Med Hx  Surg Hx  Fam Hx          Reviewed and updated as needed this visit by Provider   Tobacco  Allergies  Meds  Problems  Med Hx  Surg Hx  Fam Hx         Past Medical History:   Diagnosis Date     Depressive disorder      IUD (intrauterine device) in place     Mirena     Major depression       Past Surgical History:   Procedure Laterality Date     CHOLECYSTECTOMY  1997       Review of Systems   Constitutional: Negative for chills and fever.   HENT: Negative for congestion, ear pain, hearing loss and sore throat.    Eyes: Negative for pain and visual disturbance.   Respiratory: Negative for cough  "and shortness of breath.    Cardiovascular: Negative for chest pain, palpitations and peripheral edema.   Gastrointestinal: Negative for abdominal pain, constipation, diarrhea, heartburn, hematochezia and nausea.   Genitourinary: Negative for dysuria, frequency, genital sores, hematuria and urgency.   Musculoskeletal: Negative for arthralgias, joint swelling and myalgias.   Skin: Negative for rash.   Neurological: Negative for dizziness, weakness, headaches and paresthesias.   Psychiatric/Behavioral: Negative for mood changes. The patient is nervous/anxious.       OBJECTIVE:   /74   Pulse 79   Temp 98.4  F (36.9  C) (Tympanic)   Resp 18   Ht 1.66 m (5' 5.35\")   Wt 69.2 kg (152 lb 9.6 oz)   SpO2 97%   BMI 25.12 kg/m    Physical Exam  GENERAL: healthy, alert and no distress  EYES: Eyes grossly normal to inspection, PERRL and conjunctivae and sclerae normal  HENT: ear canals and TM's normal, nose and mouth without ulcers or lesions  NECK: no adenopathy, no asymmetry, masses, or scars and thyroid normal to palpation  RESP: lungs clear to auscultation - no rales, rhonchi or wheezes  BREAST: normal without masses, tenderness or nipple discharge and no palpable axillary masses or adenopathy  CV: regular rate and rhythm, normal S1 S2, no S3 or S4, no murmur, click or rub, no peripheral edema and peripheral pulses strong  ABDOMEN: soft, nontender, no hepatosplenomegaly, no masses and bowel sounds normal  MS: no gross musculoskeletal defects noted, no edema  SKIN: small scab right upper chest below clavicle, otherwise no suspicious lesions or rashes  NEURO: Normal strength and tone, mentation intact and speech normal  PSYCH: mentation appears normal, affect normal/bright    Diagnostic Test Results: none today     ASSESSMENT/PLAN:   Routine general medical examination at a health care facility    Mixed hyperlipidemia  Had nausea, vomiting, diarrhea and body aches within 2-3 days of starting rosuvastatin and has " not taken since. Notes the symptoms lasted about a week or 2 after stopping. She is willing to retry. Discussed retrial of rosuvastatin at 10 mg daily for a couple weeks, then consider increase to 20 mg daily if well tolerated and let me know via [employer]. If tolerated, future orders placed for fasting cholesterol and CMP in 2-3 months.   - Comprehensive metabolic panel (BMP + Alb, Alk Phos, ALT, AST, Total. Bili, TP)  - Lipid panel reflex to direct LDL Fasting  - PRIMARY CARE FOLLOW-UP SCHEDULING    Screen for colon cancer  Due in 12/2023  - Colonoscopy Screening  Referral    Need for shingles vaccine  - ZOSTER VACCINE RECOMBINANT ADJUVANTED (SHINGRIX)    Family history of breast cancer in mother  Mammogram recommended and encouraged. Patient has significant anxiety and fear surrounding this and patient became tearful when discussing it. She does daily breast exams in the shower. We discussed considering pre-treatment with valium prior to mammogram which she will think about and let me know.     Skin lesion  Appears to be a scab right upper chest, patient unsure how long it's been there or if she scratched herself. Suggested monitoring and follow up for biopsy if changing or not resolving. No history of skin cancer.       Patient has been advised of split billing requirements and indicates understanding: Yes      COUNSELING:  Reviewed preventive health counseling, as reflected in patient instructions       Regular exercise       Healthy diet/nutrition       Vision screening       Breast cancer screening       Immunizations    Vaccinated for: Zoster and Declined: Covid-19           Colorectal Cancer Screening      She reports that she has never smoked. She has never used smokeless tobacco.      Emma Alvarado PA-C  [facility]

## 2023-06-01 DIAGNOSIS — F32.1 MODERATE SINGLE CURRENT EPISODE OF MAJOR DEPRESSIVE DISORDER (H): ICD-10-CM

## 2023-06-01 RX ORDER — FLUOXETINE 40 MG/1
CAPSULE ORAL
Qty: 90 CAPSULE | Refills: 1 | Status: SHIPPED | OUTPATIENT
Start: 2023-06-01 | End: 2023-11-30

## 2023-06-01 RX ORDER — BUPROPION HYDROCHLORIDE 300 MG/1
TABLET ORAL
Qty: 90 TABLET | Refills: 1 | Status: SHIPPED | OUTPATIENT
Start: 2023-06-01 | End: 2023-11-30

## 2023-06-23 ENCOUNTER — MYC REFILL (OUTPATIENT)
Dept: FAMILY MEDICINE | Facility: CLINIC | Age: 58
End: 2023-06-23
Payer: COMMERCIAL

## 2023-06-23 DIAGNOSIS — F90.0 ATTENTION DEFICIT HYPERACTIVITY DISORDER (ADHD), PREDOMINANTLY INATTENTIVE TYPE: ICD-10-CM

## 2023-06-23 RX ORDER — DEXMETHYLPHENIDATE HYDROCHLORIDE 10 MG/1
10 CAPSULE, EXTENDED RELEASE ORAL DAILY
Qty: 30 CAPSULE | Refills: 0 | Status: SHIPPED | OUTPATIENT
Start: 2023-06-23 | End: 2023-08-08

## 2023-08-08 ENCOUNTER — MYC REFILL (OUTPATIENT)
Dept: FAMILY MEDICINE | Facility: CLINIC | Age: 58
End: 2023-08-08
Payer: COMMERCIAL

## 2023-08-08 DIAGNOSIS — F90.0 ATTENTION DEFICIT HYPERACTIVITY DISORDER (ADHD), PREDOMINANTLY INATTENTIVE TYPE: ICD-10-CM

## 2023-08-09 RX ORDER — DEXMETHYLPHENIDATE HYDROCHLORIDE 10 MG/1
10 CAPSULE, EXTENDED RELEASE ORAL DAILY
Qty: 30 CAPSULE | Refills: 0 | Status: SHIPPED | OUTPATIENT
Start: 2023-08-09 | End: 2023-09-16

## 2023-08-19 DIAGNOSIS — E78.2 MIXED HYPERLIPIDEMIA: ICD-10-CM

## 2023-08-21 RX ORDER — ROSUVASTATIN CALCIUM 20 MG/1
20 TABLET, COATED ORAL DAILY
Qty: 90 TABLET | Refills: 1 | Status: SHIPPED | OUTPATIENT
Start: 2023-08-21 | End: 2024-01-22

## 2023-09-16 ENCOUNTER — MYC REFILL (OUTPATIENT)
Dept: FAMILY MEDICINE | Facility: CLINIC | Age: 58
End: 2023-09-16
Payer: COMMERCIAL

## 2023-09-16 DIAGNOSIS — F90.0 ATTENTION DEFICIT HYPERACTIVITY DISORDER (ADHD), PREDOMINANTLY INATTENTIVE TYPE: ICD-10-CM

## 2023-09-18 RX ORDER — DEXMETHYLPHENIDATE HYDROCHLORIDE 10 MG/1
10 CAPSULE, EXTENDED RELEASE ORAL DAILY
Qty: 30 CAPSULE | Refills: 0 | Status: SHIPPED | OUTPATIENT
Start: 2023-09-18 | End: 2023-10-30

## 2023-10-11 ENCOUNTER — MYC MEDICAL ADVICE (OUTPATIENT)
Dept: FAMILY MEDICINE | Facility: CLINIC | Age: 58
End: 2023-10-11
Payer: COMMERCIAL

## 2023-10-30 ENCOUNTER — TELEPHONE (OUTPATIENT)
Dept: FAMILY MEDICINE | Facility: CLINIC | Age: 58
End: 2023-10-30
Payer: COMMERCIAL

## 2023-10-30 ENCOUNTER — MYC REFILL (OUTPATIENT)
Dept: FAMILY MEDICINE | Facility: CLINIC | Age: 58
End: 2023-10-30
Payer: COMMERCIAL

## 2023-10-30 DIAGNOSIS — F90.0 ATTENTION DEFICIT HYPERACTIVITY DISORDER (ADHD), PREDOMINANTLY INATTENTIVE TYPE: ICD-10-CM

## 2023-10-30 RX ORDER — DEXMETHYLPHENIDATE HYDROCHLORIDE 10 MG/1
10 CAPSULE, EXTENDED RELEASE ORAL DAILY
Qty: 30 CAPSULE | Refills: 0 | Status: SHIPPED | OUTPATIENT
Start: 2023-10-30 | End: 2023-12-13

## 2023-10-30 NOTE — TELEPHONE ENCOUNTER
I do actually need to see patient every 6 months to continue refilling her focalin given it is a controlled substance - OK for video visit if that is more convenient. Appointment does not need to be 11/3, but sometime in the next month or so - certainly by end of the year.   Emma Alvarado PA-C on 10/30/2023 at 6:10 PM

## 2023-10-30 NOTE — TELEPHONE ENCOUNTER
Reason for Call:  Appointment Request    Patient requesting this type of appt: Chronic Diease Management/Medication/Follow-Up    Requested provider: Emma Alvarado    Reason patient unable to be scheduled: Not within requested timeframe    When does patient want to be seen/preferred time: 3-7 days    Comments: Patient has labs on Friday 11/3/2023, can she see you on Friday after lab or a different day?    Could we send this information to you in [employer] or would you prefer to receive a phone call?:   Patient would like to be contacted via [employer]    Call taken on 10/30/2023 at 11:26 AM by Deedee Bolanos

## 2023-10-30 NOTE — TELEPHONE ENCOUNTER
OK to use my 2:30 PM same day on Friday if that works for patient  Emma Alvarado PA-C on 10/30/2023 at 12:05 PM

## 2023-10-30 NOTE — TELEPHONE ENCOUNTER
S/w pt who has a lab appt @ 1:15pm on 11/3/23.     Pt does NOT need to see Emma for an appt.  She had mentioned needing a flu & Covid shot which are now scheduled at a pharmacy.    Unless Emma would like to see pt, there is no need for the 2:30pm appt on 11/03/23.     Please advise if you would like us to schedule a follow-up appt to discuss labwork (VV or in-person).    Jen Craig on 10/30/2023 at 5:57 PM

## 2023-11-03 ENCOUNTER — LAB (OUTPATIENT)
Dept: LAB | Facility: CLINIC | Age: 58
End: 2023-11-03
Attending: PHYSICIAN ASSISTANT
Payer: COMMERCIAL

## 2023-11-03 DIAGNOSIS — R79.89 ELEVATED LFTS: ICD-10-CM

## 2023-11-03 DIAGNOSIS — E78.2 MIXED HYPERLIPIDEMIA: ICD-10-CM

## 2023-11-03 DIAGNOSIS — Z83.49 FAMILY HISTORY OF HEMOCHROMATOSIS: ICD-10-CM

## 2023-11-03 LAB
ALBUMIN SERPL BCG-MCNC: 4.7 G/DL (ref 3.5–5.2)
ALP SERPL-CCNC: 54 U/L (ref 35–104)
ALT SERPL W P-5'-P-CCNC: 28 U/L (ref 0–50)
ANION GAP SERPL CALCULATED.3IONS-SCNC: 12 MMOL/L (ref 7–15)
AST SERPL W P-5'-P-CCNC: 30 U/L (ref 0–45)
BILIRUB SERPL-MCNC: 0.8 MG/DL
BUN SERPL-MCNC: 11.1 MG/DL (ref 6–20)
CALCIUM SERPL-MCNC: 10.3 MG/DL (ref 8.6–10)
CHLORIDE SERPL-SCNC: 103 MMOL/L (ref 98–107)
CHOLEST SERPL-MCNC: 160 MG/DL
CREAT SERPL-MCNC: 0.87 MG/DL (ref 0.51–0.95)
DEPRECATED HCO3 PLAS-SCNC: 25 MMOL/L (ref 22–29)
EGFRCR SERPLBLD CKD-EPI 2021: 77 ML/MIN/1.73M2
FERRITIN SERPL-MCNC: 76 NG/ML (ref 11–328)
GLUCOSE SERPL-MCNC: 93 MG/DL (ref 70–99)
HDLC SERPL-MCNC: 60 MG/DL
IRON BINDING CAPACITY (ROCHE): 313 UG/DL (ref 240–430)
IRON SATN MFR SERPL: 30 % (ref 15–46)
IRON SERPL-MCNC: 95 UG/DL (ref 37–145)
LDLC SERPL CALC-MCNC: 79 MG/DL
NONHDLC SERPL-MCNC: 100 MG/DL
POTASSIUM SERPL-SCNC: 4.4 MMOL/L (ref 3.4–5.3)
PROT SERPL-MCNC: 7.4 G/DL (ref 6.4–8.3)
SODIUM SERPL-SCNC: 140 MMOL/L (ref 135–145)
TRIGL SERPL-MCNC: 104 MG/DL

## 2023-11-03 PROCEDURE — 82728 ASSAY OF FERRITIN: CPT

## 2023-11-03 PROCEDURE — 83540 ASSAY OF IRON: CPT

## 2023-11-03 PROCEDURE — 80061 LIPID PANEL: CPT

## 2023-11-03 PROCEDURE — 80053 COMPREHEN METABOLIC PANEL: CPT

## 2023-11-03 PROCEDURE — 83550 IRON BINDING TEST: CPT

## 2023-11-03 PROCEDURE — 36415 COLL VENOUS BLD VENIPUNCTURE: CPT

## 2023-11-06 NOTE — RESULT ENCOUNTER NOTE
Shanique,    I have reviewed your lab results below:    - calcium is just mildly elevated, we can recheck this in the future but not urgent  - otherwise electrolytes, blood sugar, kidney and liver function normal   - iron levels are normal  - cholesterol looks good     Please let me know if you have any further questions.    Take care,  Emma Alvarado PA-C   11/6/2023   7:15 AM

## 2023-11-09 NOTE — TELEPHONE ENCOUNTER
Called pt to assist with scheduling med check appt. Pt states her understanding and will schedule her appt via [employer].     Postponing encounter to make sure pt is able to schedule.    Angela Rob,  [facility]

## 2023-11-30 DIAGNOSIS — F32.1 MODERATE SINGLE CURRENT EPISODE OF MAJOR DEPRESSIVE DISORDER (H): ICD-10-CM

## 2023-11-30 RX ORDER — BUPROPION HYDROCHLORIDE 300 MG/1
TABLET ORAL
Qty: 90 TABLET | Refills: 1 | Status: SHIPPED | OUTPATIENT
Start: 2023-11-30 | End: 2024-06-05

## 2023-11-30 RX ORDER — FLUOXETINE 40 MG/1
CAPSULE ORAL
Qty: 90 CAPSULE | Refills: 1 | Status: SHIPPED | OUTPATIENT
Start: 2023-11-30 | End: 2024-01-22 | Stop reason: ALTCHOICE

## 2023-12-13 ENCOUNTER — MYC REFILL (OUTPATIENT)
Dept: FAMILY MEDICINE | Facility: CLINIC | Age: 58
End: 2023-12-13
Payer: COMMERCIAL

## 2023-12-13 DIAGNOSIS — F90.0 ATTENTION DEFICIT HYPERACTIVITY DISORDER (ADHD), PREDOMINANTLY INATTENTIVE TYPE: ICD-10-CM

## 2023-12-14 RX ORDER — DEXMETHYLPHENIDATE HYDROCHLORIDE 10 MG/1
10 CAPSULE, EXTENDED RELEASE ORAL DAILY
Qty: 30 CAPSULE | Refills: 0 | Status: SHIPPED | OUTPATIENT
Start: 2023-12-14 | End: 2023-12-21

## 2023-12-19 ENCOUNTER — MYC MEDICAL ADVICE (OUTPATIENT)
Dept: FAMILY MEDICINE | Facility: CLINIC | Age: 58
End: 2023-12-19
Payer: COMMERCIAL

## 2023-12-19 DIAGNOSIS — F90.0 ATTENTION DEFICIT HYPERACTIVITY DISORDER (ADHD), PREDOMINANTLY INATTENTIVE TYPE: Primary | ICD-10-CM

## 2023-12-20 ENCOUNTER — TELEPHONE (OUTPATIENT)
Dept: FAMILY MEDICINE | Facility: CLINIC | Age: 58
End: 2023-12-20

## 2023-12-20 DIAGNOSIS — F90.0 ATTENTION DEFICIT HYPERACTIVITY DISORDER (ADHD), PREDOMINANTLY INATTENTIVE TYPE: ICD-10-CM

## 2023-12-20 RX ORDER — METHYLPHENIDATE HYDROCHLORIDE 10 MG/1
10 CAPSULE, EXTENDED RELEASE ORAL EVERY MORNING
Qty: 30 CAPSULE | Refills: 0 | Status: SHIPPED | OUTPATIENT
Start: 2023-12-20 | End: 2023-12-21

## 2023-12-20 NOTE — TELEPHONE ENCOUNTER
Please see [employer] message and advise.  Pt referring to dexmethylphenidate.    Moraima Zamarripa RN

## 2023-12-21 RX ORDER — DEXMETHYLPHENIDATE HYDROCHLORIDE 10 MG/1
10 CAPSULE, EXTENDED RELEASE ORAL DAILY
Qty: 30 CAPSULE | Refills: 0 | Status: SHIPPED | OUTPATIENT
Start: 2023-12-21 | End: 2024-01-22

## 2023-12-21 NOTE — TELEPHONE ENCOUNTER
This is the alternative (methylphenidate/Ritalin LA) to her usual medication (dexmethylphenidate/Focalin). It was my understanding the dexmethylphenidate was backordered at  level, however note from pharmacy regarding methylphenidate just states not available - she may be able to find at another pharmacy.  Emma Carrasco PA-C on 12/21/2023 at 8:43 AM

## 2023-12-21 NOTE — TELEPHONE ENCOUNTER
Spoke with pharmacist and they state pharmacy is out of both, that both are on backorder no nearby stores have methylphenidate.     Pharmacist states that [facility] has Dexmethylphenidate in stock. If they do not have her recommendations is for [facility] different [facility].     Spoke with pt and she is willing to drive to [facility] to .     Medication and pharmacy pended.     Moraima Zamarripa RN

## 2023-12-21 NOTE — TELEPHONE ENCOUNTER
Recommend patient call other pharmacies to see who has this in stock and can send to alternative pharmacy.   Emma Carrasco PA-C on 12/21/2023 at 7:51 AM

## 2023-12-21 NOTE — TELEPHONE ENCOUNTER
Spoke with pt and she states that this medication is on back  order and unavailable at a manufacturing level so other pharmacies are out as well.     Is there an alternative medication until this is back in stock?       Moraima Zamarripa RN

## 2024-01-15 ASSESSMENT — ANXIETY QUESTIONNAIRES
6. BECOMING EASILY ANNOYED OR IRRITABLE: MORE THAN HALF THE DAYS
4. TROUBLE RELAXING: MORE THAN HALF THE DAYS
7. FEELING AFRAID AS IF SOMETHING AWFUL MIGHT HAPPEN: SEVERAL DAYS
7. FEELING AFRAID AS IF SOMETHING AWFUL MIGHT HAPPEN: SEVERAL DAYS
8. IF YOU CHECKED OFF ANY PROBLEMS, HOW DIFFICULT HAVE THESE MADE IT FOR YOU TO DO YOUR WORK, TAKE CARE OF THINGS AT HOME, OR GET ALONG WITH OTHER PEOPLE?: SOMEWHAT DIFFICULT
5. BEING SO RESTLESS THAT IT IS HARD TO SIT STILL: NOT AT ALL
IF YOU CHECKED OFF ANY PROBLEMS ON THIS QUESTIONNAIRE, HOW DIFFICULT HAVE THESE PROBLEMS MADE IT FOR YOU TO DO YOUR WORK, TAKE CARE OF THINGS AT HOME, OR GET ALONG WITH OTHER PEOPLE: SOMEWHAT DIFFICULT
2. NOT BEING ABLE TO STOP OR CONTROL WORRYING: MORE THAN HALF THE DAYS
1. FEELING NERVOUS, ANXIOUS, OR ON EDGE: MORE THAN HALF THE DAYS
GAD7 TOTAL SCORE: 11
3. WORRYING TOO MUCH ABOUT DIFFERENT THINGS: MORE THAN HALF THE DAYS
GAD7 TOTAL SCORE: 11

## 2024-01-22 ENCOUNTER — VIRTUAL VISIT (OUTPATIENT)
Dept: FAMILY MEDICINE | Facility: CLINIC | Age: 59
End: 2024-01-22
Attending: PHYSICIAN ASSISTANT
Payer: COMMERCIAL

## 2024-01-22 DIAGNOSIS — F90.0 ATTENTION DEFICIT HYPERACTIVITY DISORDER (ADHD), PREDOMINANTLY INATTENTIVE TYPE: Primary | ICD-10-CM

## 2024-01-22 DIAGNOSIS — F32.1 MODERATE SINGLE CURRENT EPISODE OF MAJOR DEPRESSIVE DISORDER (H): ICD-10-CM

## 2024-01-22 DIAGNOSIS — E78.2 MIXED HYPERLIPIDEMIA: ICD-10-CM

## 2024-01-22 DIAGNOSIS — F41.1 ANXIETY STATE: ICD-10-CM

## 2024-01-22 PROCEDURE — 99441 PR PHYSICIAN TELEPHONE EVALUATION 5-10 MIN: CPT | Mod: 93 | Performed by: PHYSICIAN ASSISTANT

## 2024-01-22 RX ORDER — SERTRALINE HYDROCHLORIDE 100 MG/1
100 TABLET, FILM COATED ORAL DAILY
Qty: 90 TABLET | Refills: 1 | Status: SHIPPED | OUTPATIENT
Start: 2024-01-22 | End: 2024-07-16

## 2024-01-22 RX ORDER — ROSUVASTATIN CALCIUM 20 MG/1
20 TABLET, COATED ORAL DAILY
Qty: 90 TABLET | Refills: 3 | Status: SHIPPED | OUTPATIENT
Start: 2024-01-22

## 2024-01-22 RX ORDER — DEXMETHYLPHENIDATE HYDROCHLORIDE 10 MG/1
10 CAPSULE, EXTENDED RELEASE ORAL DAILY
Qty: 30 CAPSULE | Refills: 0 | Status: SHIPPED | OUTPATIENT
Start: 2024-03-22 | End: 2024-05-02

## 2024-01-22 RX ORDER — DEXMETHYLPHENIDATE HYDROCHLORIDE 10 MG/1
10 CAPSULE, EXTENDED RELEASE ORAL DAILY
Qty: 30 CAPSULE | Refills: 0 | Status: SHIPPED | OUTPATIENT
Start: 2024-02-21 | End: 2024-05-02

## 2024-01-22 RX ORDER — DEXMETHYLPHENIDATE HYDROCHLORIDE 10 MG/1
10 CAPSULE, EXTENDED RELEASE ORAL DAILY
Qty: 30 CAPSULE | Refills: 0 | Status: SHIPPED | OUTPATIENT
Start: 2024-01-22 | End: 2024-05-02

## 2024-01-22 ASSESSMENT — PATIENT HEALTH QUESTIONNAIRE - PHQ9
SUM OF ALL RESPONSES TO PHQ QUESTIONS 1-9: 6
10. IF YOU CHECKED OFF ANY PROBLEMS, HOW DIFFICULT HAVE THESE PROBLEMS MADE IT FOR YOU TO DO YOUR WORK, TAKE CARE OF THINGS AT HOME, OR GET ALONG WITH OTHER PEOPLE: SOMEWHAT DIFFICULT
SUM OF ALL RESPONSES TO PHQ QUESTIONS 1-9: 6

## 2024-01-22 NOTE — PROGRESS NOTES
Shanique is a 58 year old who is being evaluated via a billable telephone visit.      What phone number would you like to be contacted at? [phone]   How would you like to obtain your AVS? [facility]  Distant Location (provider location):  On-site    Assessment & Plan     Attention deficit hyperactivity disorder (ADHD), predominantly inattentive type  Stable. Refills sent for Jan/Feb/March. OK to call/[facility] request refills for April/May/June. Then plan for follow up annual exam in July.   - dexmethylphenidate (FOCALIN XR) 10 MG 24 hr capsule  Dispense: 30 capsule; Refill: 0  - dexmethylphenidate (FOCALIN XR) 10 MG 24 hr capsule  Dispense: 30 capsule; Refill: 0  - dexmethylphenidate (FOCALIN XR) 10 MG 24 hr capsule  Dispense: 30 capsule; Refill: 0    Moderate single current episode of major depressive disorder (H)  Anxiety state  Feeling things have not been well controlled on current regimen. Interested in switching fluoxetine to something different, will switch from fluoxetine 40 mg to sertraline 100 mg daily. Continue wellbutrin at same dose of 300 mg daily for now, but could consider decrease in the future to see if this relieves some of her anxiety symptoms. She will update me on how she's feeling in 1-2 months. If feeling good and wanting to stay on same regimen - OK to just send [facility] message with update. If feeling further adjustment needed - recommend phone or video visit to discuss.   - sertraline (ZOLOFT) 100 MG tablet  Dispense: 90 tablet; Refill: 1    Mixed hyperlipidemia  Stable, well controlled on crestor.   - refill rosuvastatin (CRESTOR) 20 MG tablet  Dispense: 90 tablet; Refill: 3    Emma Carrasco PA-C on 1/22/2024 at 11:49 AM      Subjective   Shanique is a 58 year old, presenting for the following health issues:  Recheck Medication        1/22/2024    10:34 AM   Additional Questions   Roomed by Shona SARGENT     History of Present Illness       Mental Health Follow-up:  Patient presents to  follow-up on Depression & Anxiety.Patient's depression since last visit has been:  No change  The patient is not having other symptoms associated with depression.  Patient's anxiety since last visit has been:  No change  The patient is not having other symptoms associated with anxiety.  Any significant life events: No  Patient is not feeling anxious or having panic attacks.  Patient has no concerns about alcohol or drug use.    Hyperlipidemia:  She presents for follow up of hyperlipidemia.   She is taking medication to lower cholesterol. She is not having myalgia or other side effects to statin medications.    Reason for visit:  Requested by Emma Alvarado    She eats 2-3 servings of fruits and vegetables daily.She consumes 0 sweetened beverage(s) daily.She exercises with enough effort to increase her heart rate 30 to 60 minutes per day.  She exercises with enough effort to increase her heart rate 3 or less days per week.   She is taking medications regularly.     Anxiety/depression  On wellbutrin and prozac for 10 years  Feels things are not well controlled overall   About 60/40 anxiety/depression respectively   Interested in medication adjustments     ADHD  On focalin XR 10 mg daily for quite some time  Stable on this and tolerates well      Review of Systems  Constitutional, HEENT, cardiovascular, pulmonary, gi and gu systems are negative, except as otherwise noted.      Objective           Vitals:  No vitals were obtained today due to virtual visit.    Physical Exam   General: Alert and no distress //Respiratory: No audible wheeze, cough, or shortness of breath // Psychiatric:  Appropriate affect, tone, and pace of words          Phone call duration: 9 minutes  Signed Electronically by: Emma Carrasco PA-C on 1/22/2024 at 11:58 AM

## 2024-03-20 ENCOUNTER — MYC REFILL (OUTPATIENT)
Dept: FAMILY MEDICINE | Facility: CLINIC | Age: 59
End: 2024-03-20
Payer: COMMERCIAL

## 2024-03-20 DIAGNOSIS — F90.0 ATTENTION DEFICIT HYPERACTIVITY DISORDER (ADHD), PREDOMINANTLY INATTENTIVE TYPE: ICD-10-CM

## 2024-03-20 RX ORDER — DEXMETHYLPHENIDATE HYDROCHLORIDE 10 MG/1
10 CAPSULE, EXTENDED RELEASE ORAL DAILY
Qty: 30 CAPSULE | Refills: 0 | OUTPATIENT
Start: 2024-03-20

## 2024-04-05 ENCOUNTER — PATIENT OUTREACH (OUTPATIENT)
Dept: CARE COORDINATION | Facility: CLINIC | Age: 59
End: 2024-04-05
Payer: COMMERCIAL

## 2024-04-12 ENCOUNTER — OFFICE VISIT (OUTPATIENT)
Dept: URGENT CARE | Facility: URGENT CARE | Age: 59
End: 2024-04-12
Payer: COMMERCIAL

## 2024-04-12 VITALS
HEART RATE: 76 BPM | BODY MASS INDEX: 27.18 KG/M2 | OXYGEN SATURATION: 96 % | SYSTOLIC BLOOD PRESSURE: 127 MMHG | WEIGHT: 165.1 LBS | TEMPERATURE: 98.1 F | DIASTOLIC BLOOD PRESSURE: 83 MMHG

## 2024-04-12 DIAGNOSIS — J30.2 SEASONAL ALLERGIES: Primary | ICD-10-CM

## 2024-04-12 DIAGNOSIS — H69.92 DYSFUNCTION OF LEFT EUSTACHIAN TUBE: ICD-10-CM

## 2024-04-12 DIAGNOSIS — H69.92 NEGATIVE MIDDLE EAR PRESSURE OF LEFT EAR: ICD-10-CM

## 2024-04-12 PROCEDURE — 99213 OFFICE O/P EST LOW 20 MIN: CPT | Performed by: PHYSICIAN ASSISTANT

## 2024-04-12 RX ORDER — PSEUDOEPHEDRINE HCL 60 MG
60 TABLET ORAL EVERY 4 HOURS PRN
Qty: 30 TABLET | Refills: 0 | Status: SHIPPED | OUTPATIENT
Start: 2024-04-12 | End: 2024-08-21

## 2024-04-12 RX ORDER — FLUTICASONE PROPIONATE 50 MCG
1 SPRAY, SUSPENSION (ML) NASAL DAILY
Qty: 16 G | Refills: 0 | Status: SHIPPED | OUTPATIENT
Start: 2024-04-12

## 2024-04-12 RX ORDER — PREDNISONE 20 MG/1
20 TABLET ORAL 2 TIMES DAILY
Qty: 10 TABLET | Refills: 0 | Status: SHIPPED | OUTPATIENT
Start: 2024-04-12 | End: 2024-08-21

## 2024-04-12 NOTE — PROGRESS NOTES
"  Assessment & Plan     Seasonal allergies    Continue allergy medications  Add flonase    - fluticasone (FLONASE) 50 MCG/ACT nasal spray; Spray 1 spray into both nostrils daily  - pseudoePHEDrine (SUDAFED) 60 MG tablet; Take 1 tablet (60 mg) by mouth every 4 hours as needed for congestion    Dysfunction of left eustachian tube    The eustachian (say \"you-STAY-shee-un\") tubes connect the middle ear on each side to the back of the throat. They keep air pressure stable in the ears. If your eustachian tubes become blocked, the air pressure in your ears changes. A quick change in air pressure can cause eustachian tubes to close up. This might happen when an airplane changes altitude or when a  goes up or down underwater. And a cold can make the tubes swell and block the fluid in the middle ear from draining out. That can cause pain.  Eustachian tube problems often clear up on their own or after treating the cause of the blockage.    - fluticasone (FLONASE) 50 MCG/ACT nasal spray; Spray 1 spray into both nostrils daily  - predniSONE (DELTASONE) 20 MG tablet; Take 1 tablet (20 mg) by mouth 2 times daily  - pseudoePHEDrine (SUDAFED) 60 MG tablet; Take 1 tablet (60 mg) by mouth every 4 hours as needed for congestion  - Adult ENT  Referral; Future    Negative middle ear pressure of left ear    Start on medications  If not better in 1 week then follow up with ENT    - fluticasone (FLONASE) 50 MCG/ACT nasal spray; Spray 1 spray into both nostrils daily  - predniSONE (DELTASONE) 20 MG tablet; Take 1 tablet (20 mg) by mouth 2 times daily  - pseudoePHEDrine (SUDAFED) 60 MG tablet; Take 1 tablet (60 mg) by mouth every 4 hours as needed for congestion    Review of external notes as documented elsewhere in note    BMI  Estimated body mass index is 27.18 kg/m  as calculated from the following:    Height as of 5/5/23: 1.66 m (5' 5.35\").    Weight as of this encounter: 74.9 kg (165 lb 1.6 oz). "       CONSULTATION/REFERRAL to ENT    No follow-ups on file.    Jenna Bay is a 58 year old, presenting for the following health issues:  Otalgia (Pt having left ear pain sx worsening, onset 3 weeks )    HPI     Review of Systems  Constitutional, neuro, ENT, endocrine, pulmonary, cardiac, gastrointestinal, genitourinary, musculoskeletal, integument and psychiatric systems are negative, except as otherwise noted.      Objective    /83 (BP Location: Right arm, Patient Position: Sitting, Cuff Size: Adult Regular)   Pulse 76   Temp 98.1  F (36.7  C) (Tympanic)   Wt 74.9 kg (165 lb 1.6 oz)   LMP 07/31/2006   SpO2 96%   BMI 27.18 kg/m    Body mass index is 27.18 kg/m .  Physical Exam   GENERAL: alert and no distress  EYES: Eyes grossly normal to inspection, PERRL and conjunctivae and sclerae normal  HENT: ear canals and TM's normal, nose and mouth without ulcers or lesions  NECK: no adenopathy, no asymmetry, masses, or scars  RESP: lungs clear to auscultation - no rales, rhonchi or wheezes  CV: regular rate and rhythm, normal S1 S2, no S3 or S4, no murmur, click or rub, no peripheral edema  MS: no gross musculoskeletal defects noted, no edema  SKIN: no suspicious lesions or rashes  NEURO: Normal strength and tone, mentation intact and speech normal  PSYCH: mentation appears normal, affect normal/bright            Signed Electronically by: Herber Acosta, [facility], [place]

## 2024-04-19 ENCOUNTER — PATIENT OUTREACH (OUTPATIENT)
Dept: CARE COORDINATION | Facility: CLINIC | Age: 59
End: 2024-04-19
Payer: COMMERCIAL

## 2024-05-02 ENCOUNTER — MYC REFILL (OUTPATIENT)
Dept: FAMILY MEDICINE | Facility: CLINIC | Age: 59
End: 2024-05-02
Payer: COMMERCIAL

## 2024-05-02 DIAGNOSIS — F90.0 ATTENTION DEFICIT HYPERACTIVITY DISORDER (ADHD), PREDOMINANTLY INATTENTIVE TYPE: ICD-10-CM

## 2024-05-02 RX ORDER — DEXMETHYLPHENIDATE HYDROCHLORIDE 10 MG/1
10 CAPSULE, EXTENDED RELEASE ORAL DAILY
Qty: 30 CAPSULE | Refills: 0 | Status: SHIPPED | OUTPATIENT
Start: 2024-07-01 | End: 2024-07-10

## 2024-05-02 RX ORDER — DEXMETHYLPHENIDATE HYDROCHLORIDE 10 MG/1
10 CAPSULE, EXTENDED RELEASE ORAL DAILY
Qty: 30 CAPSULE | Refills: 0 | Status: SHIPPED | OUTPATIENT
Start: 2024-06-01 | End: 2024-08-21

## 2024-05-02 RX ORDER — DEXMETHYLPHENIDATE HYDROCHLORIDE 10 MG/1
10 CAPSULE, EXTENDED RELEASE ORAL DAILY
Qty: 30 CAPSULE | Refills: 0 | Status: SHIPPED | OUTPATIENT
Start: 2024-05-02 | End: 2024-08-21

## 2024-06-03 DIAGNOSIS — J30.2 SEASONAL ALLERGIES: ICD-10-CM

## 2024-06-03 DIAGNOSIS — H69.92 DYSFUNCTION OF LEFT EUSTACHIAN TUBE: ICD-10-CM

## 2024-06-03 DIAGNOSIS — H69.92 NEGATIVE MIDDLE EAR PRESSURE OF LEFT EAR: ICD-10-CM

## 2024-06-03 RX ORDER — FLUTICASONE PROPIONATE 50 MCG
1 SPRAY, SUSPENSION (ML) NASAL DAILY
Qty: 24 ML | Refills: 1 | OUTPATIENT
Start: 2024-06-03

## 2024-06-05 DIAGNOSIS — F32.1 MODERATE SINGLE CURRENT EPISODE OF MAJOR DEPRESSIVE DISORDER (H): ICD-10-CM

## 2024-06-05 RX ORDER — BUPROPION HYDROCHLORIDE 300 MG/1
TABLET ORAL
Qty: 90 TABLET | Refills: 0 | Status: SHIPPED | OUTPATIENT
Start: 2024-06-05 | End: 2024-08-19

## 2024-07-10 ENCOUNTER — MYC REFILL (OUTPATIENT)
Dept: FAMILY MEDICINE | Facility: CLINIC | Age: 59
End: 2024-07-10
Payer: COMMERCIAL

## 2024-07-10 DIAGNOSIS — F90.0 ATTENTION DEFICIT HYPERACTIVITY DISORDER (ADHD), PREDOMINANTLY INATTENTIVE TYPE: ICD-10-CM

## 2024-07-11 RX ORDER — DEXMETHYLPHENIDATE HYDROCHLORIDE 10 MG/1
10 CAPSULE, EXTENDED RELEASE ORAL DAILY
Qty: 30 CAPSULE | Refills: 0 | Status: SHIPPED | OUTPATIENT
Start: 2024-07-11 | End: 2024-08-19

## 2024-07-16 ENCOUNTER — MYC REFILL (OUTPATIENT)
Dept: FAMILY MEDICINE | Facility: CLINIC | Age: 59
End: 2024-07-16
Payer: COMMERCIAL

## 2024-07-16 DIAGNOSIS — F41.1 ANXIETY STATE: ICD-10-CM

## 2024-07-16 DIAGNOSIS — F32.1 MODERATE SINGLE CURRENT EPISODE OF MAJOR DEPRESSIVE DISORDER (H): ICD-10-CM

## 2024-07-16 RX ORDER — SERTRALINE HYDROCHLORIDE 100 MG/1
100 TABLET, FILM COATED ORAL DAILY
Qty: 90 TABLET | Refills: 0 | Status: SHIPPED | OUTPATIENT
Start: 2024-07-16 | End: 2024-08-21

## 2024-08-10 ENCOUNTER — HEALTH MAINTENANCE LETTER (OUTPATIENT)
Age: 59
End: 2024-08-10

## 2024-08-19 ENCOUNTER — MYC REFILL (OUTPATIENT)
Dept: FAMILY MEDICINE | Facility: CLINIC | Age: 59
End: 2024-08-19
Payer: COMMERCIAL

## 2024-08-19 DIAGNOSIS — F32.1 MODERATE SINGLE CURRENT EPISODE OF MAJOR DEPRESSIVE DISORDER (H): ICD-10-CM

## 2024-08-19 DIAGNOSIS — F90.0 ATTENTION DEFICIT HYPERACTIVITY DISORDER (ADHD), PREDOMINANTLY INATTENTIVE TYPE: ICD-10-CM

## 2024-08-20 RX ORDER — BUPROPION HYDROCHLORIDE 300 MG/1
TABLET ORAL
Qty: 90 TABLET | Refills: 0 | Status: SHIPPED | OUTPATIENT
Start: 2024-08-20

## 2024-08-20 RX ORDER — DEXMETHYLPHENIDATE HYDROCHLORIDE 10 MG/1
10 CAPSULE, EXTENDED RELEASE ORAL DAILY
Qty: 30 CAPSULE | Refills: 0 | Status: SHIPPED | OUTPATIENT
Start: 2024-08-20

## 2024-08-21 ENCOUNTER — OFFICE VISIT (OUTPATIENT)
Dept: FAMILY MEDICINE | Facility: CLINIC | Age: 59
End: 2024-08-21
Payer: COMMERCIAL

## 2024-08-21 VITALS
WEIGHT: 160 LBS | BODY MASS INDEX: 25.71 KG/M2 | DIASTOLIC BLOOD PRESSURE: 70 MMHG | HEIGHT: 66 IN | SYSTOLIC BLOOD PRESSURE: 110 MMHG | OXYGEN SATURATION: 97 % | TEMPERATURE: 98 F | HEART RATE: 95 BPM

## 2024-08-21 DIAGNOSIS — F90.0 ATTENTION DEFICIT HYPERACTIVITY DISORDER (ADHD), PREDOMINANTLY INATTENTIVE TYPE: ICD-10-CM

## 2024-08-21 DIAGNOSIS — F41.1 ANXIETY STATE: ICD-10-CM

## 2024-08-21 DIAGNOSIS — F32.1 MODERATE SINGLE CURRENT EPISODE OF MAJOR DEPRESSIVE DISORDER (H): ICD-10-CM

## 2024-08-21 DIAGNOSIS — R22.1 LUMP IN NECK: ICD-10-CM

## 2024-08-21 DIAGNOSIS — H69.93 DYSFUNCTION OF BOTH EUSTACHIAN TUBES: Primary | ICD-10-CM

## 2024-08-21 LAB
BASOPHILS # BLD AUTO: 0 10E3/UL (ref 0–0.2)
BASOPHILS NFR BLD AUTO: 1 %
EOSINOPHIL # BLD AUTO: 0 10E3/UL (ref 0–0.7)
EOSINOPHIL NFR BLD AUTO: 1 %
ERYTHROCYTE [DISTWIDTH] IN BLOOD BY AUTOMATED COUNT: 11.8 % (ref 10–15)
HCT VFR BLD AUTO: 40.9 % (ref 35–47)
HGB BLD-MCNC: 14.1 G/DL (ref 11.7–15.7)
IMM GRANULOCYTES # BLD: 0 10E3/UL
IMM GRANULOCYTES NFR BLD: 0 %
LYMPHOCYTES # BLD AUTO: 1.9 10E3/UL (ref 0.8–5.3)
LYMPHOCYTES NFR BLD AUTO: 35 %
MCH RBC QN AUTO: 30.1 PG (ref 26.5–33)
MCHC RBC AUTO-ENTMCNC: 34.5 G/DL (ref 31.5–36.5)
MCV RBC AUTO: 87 FL (ref 78–100)
MONOCYTES # BLD AUTO: 0.4 10E3/UL (ref 0–1.3)
MONOCYTES NFR BLD AUTO: 6 %
NEUTROPHILS # BLD AUTO: 3.1 10E3/UL (ref 1.6–8.3)
NEUTROPHILS NFR BLD AUTO: 57 %
PLATELET # BLD AUTO: 227 10E3/UL (ref 150–450)
RBC # BLD AUTO: 4.68 10E6/UL (ref 3.8–5.2)
WBC # BLD AUTO: 5.4 10E3/UL (ref 4–11)

## 2024-08-21 PROCEDURE — 99214 OFFICE O/P EST MOD 30 MIN: CPT | Performed by: PHYSICIAN ASSISTANT

## 2024-08-21 PROCEDURE — 36415 COLL VENOUS BLD VENIPUNCTURE: CPT | Performed by: PHYSICIAN ASSISTANT

## 2024-08-21 PROCEDURE — 85025 COMPLETE CBC W/AUTO DIFF WBC: CPT | Performed by: PHYSICIAN ASSISTANT

## 2024-08-21 PROCEDURE — G2211 COMPLEX E/M VISIT ADD ON: HCPCS | Performed by: PHYSICIAN ASSISTANT

## 2024-08-21 PROCEDURE — 96127 BRIEF EMOTIONAL/BEHAV ASSMT: CPT | Performed by: PHYSICIAN ASSISTANT

## 2024-08-21 RX ORDER — SERTRALINE HYDROCHLORIDE 100 MG/1
100 TABLET, FILM COATED ORAL DAILY
Qty: 90 TABLET | Refills: 3 | Status: SHIPPED | OUTPATIENT
Start: 2024-08-21

## 2024-08-21 RX ORDER — DEXMETHYLPHENIDATE HYDROCHLORIDE 10 MG/1
10 CAPSULE, EXTENDED RELEASE ORAL DAILY
Qty: 30 CAPSULE | Refills: 0 | Status: SHIPPED | OUTPATIENT
Start: 2024-09-19 | End: 2024-10-07

## 2024-08-21 RX ORDER — DEXMETHYLPHENIDATE HYDROCHLORIDE 10 MG/1
10 CAPSULE, EXTENDED RELEASE ORAL DAILY
Qty: 30 CAPSULE | Refills: 0 | Status: SHIPPED | OUTPATIENT
Start: 2024-10-19

## 2024-08-21 ASSESSMENT — PAIN SCALES - GENERAL: PAINLEVEL: MODERATE PAIN (5)

## 2024-08-21 NOTE — RESULT ENCOUNTER NOTE
Idris Bay,    Good news - labs are completely normal. Normal hemoglobin/red blood cells (no anemia), normal white blood cells (infection fighting cells), normal platelets (affect ability to clot normally)      Please let me know if you have any further questions. I will reach out once ultrasound results are available.    Take care,  Emma Carrasco PA-C on 8/21/2024 at 10:17 AM

## 2024-08-21 NOTE — PROGRESS NOTES
Assessment & Plan     Dysfunction of both eustachian tubes  Flonase at bedtime   Saline in AM  Switch antihistamine  ENT appointment in October     Lump in neck  Present since April, unchanged. CBC Normal. US ordered for further evaluation.  - CBC with platelets and differential  - US Head Neck Soft Tissue    Attention deficit hyperactivity disorder (ADHD), predominantly inattentive type  Stable. Refills sent for Aug/Sep/Oct. OK to call/MyChart for Nov/Dec/Jan. Plan for follow up virtually in Feb.   - dexmethylphenidate (FOCALIN XR) 10 MG 24 hr capsule  Dispense: 30 capsule; Refill: 0  - dexmethylphenidate (FOCALIN XR) 10 MG 24 hr capsule  Dispense: 30 capsule; Refill: 0    Moderate single current episode of major depressive disorder (H)  Stable.   - refill sertraline (ZOLOFT) 100 MG tablet  Dispense: 90 tablet; Refill: 3    Anxiety state  Stable.   - refill sertraline (ZOLOFT) 100 MG tablet  Dispense: 90 tablet; Refill: 3    Follow up virtual visit ADHD follow up in 6 months    The longitudinal plan of care for the diagnosis(es)/condition(s) as documented were addressed during this visit. Due to the added complexity in care, I will continue to support Shanique in the subsequent management and with ongoing continuity of care.     Emma Carrasco PA-C on 8/21/2024 at 9:38 AM      Jenna Bay is a 58 year old, presenting for the following health issues:  Ear Problem        8/21/2024     9:27 AM   Additional Questions   Roomed by Shania TURK     History of Present Illness       Reason for visit:  Ear pain, lump under jaw  Symptom onset:  More than a month  Symptoms include:  Ear pain, fullness, dizziness and lump under jawline  Symptom intensity:  Moderate  Symptom progression:  Staying the same  Had these symptoms before:  No  What makes it worse:  No  What makes it better:  No   She is taking medications regularly.       Pt has had ear pain in both ears for several months. She was given prednisone and  "antihistamines ,which had helped at the time but still present , Pt has an appt in October with ENT  Pt also has a lump underneath jaw on right side she would like looked at.         [facility] visit in April 2024 for eustachian tube dysfunction  Treated with prednisone, sudafed and flonase - improved with this  Fluctuates with symptoms   Having some vertigo as well   Has appointment with ENT in October   Continues to have fullness, pain, pulsing sensation - initially in left and now in both when laying on that side   Takes an OTC allergy med   Not using flonase regularly   Lump right side of neck for a few months - at least since April, not painful, not changing     Anxiety/depression  On wellbutrin   Switched from prozac to zoloft in 1/2024  Feeling good      ADHD  On focalin XR 10 mg daily for quite some time  Stable on this and tolerates well       Some intermittent diarrhea - several per day. Comes and goes  Tried BRAT diet, avoiding caffeine and focalin     Wt Readings from Last 5 Encounters:   08/21/24 72.6 kg (160 lb)   04/12/24 74.9 kg (165 lb 1.6 oz)   05/05/23 69.2 kg (152 lb 9.6 oz)   06/08/22 73.9 kg (163 lb)   09/03/21 69.9 kg (154 lb)            Objective    /70   Pulse 95   Temp 98  F (36.7  C) (Tympanic)   Ht 1.678 m (5' 6.06\")   Wt 72.6 kg (160 lb)   LMP 07/31/2006   SpO2 97%   BMI 25.78 kg/m    Body mass index is 25.78 kg/m .  Physical Exam   GENERAL: alert and no distress  EYES: Eyes grossly normal to inspection, PERRL and conjunctivae and sclerae normal  HENT: normal cephalic/atraumatic, both ears: clear effusion, nose and mouth without ulcers or lesions, oropharynx clear, and oral mucous membranes moist  NECK: firm smooth painless lump right anterior neck.  NEURO: Normal strength and tone, mentation intact and speech normal  PSYCH: mentation appears normal, affect normal/bright        Office Visit on 08/21/2024   Component Date Value Ref Range Status    WBC Count 08/21/2024 5.4  4.0 - 11.0 " 10e3/uL Final    RBC Count 08/21/2024 4.68  3.80 - 5.20 10e6/uL Final    Hemoglobin 08/21/2024 14.1  11.7 - 15.7 g/dL Final    Hematocrit 08/21/2024 40.9  35.0 - 47.0 % Final    MCV 08/21/2024 87  78 - 100 fL Final    MCH 08/21/2024 30.1  26.5 - 33.0 pg Final    MCHC 08/21/2024 34.5  31.5 - 36.5 g/dL Final    RDW 08/21/2024 11.8  10.0 - 15.0 % Final    Platelet Count 08/21/2024 227  150 - 450 10e3/uL Final    % Neutrophils 08/21/2024 57  % Final    % Lymphocytes 08/21/2024 35  % Final    % Monocytes 08/21/2024 6  % Final    % Eosinophils 08/21/2024 1  % Final    % Basophils 08/21/2024 1  % Final    % Immature Granulocytes 08/21/2024 0  % Final    Absolute Neutrophils 08/21/2024 3.1  1.6 - 8.3 10e3/uL Final    Absolute Lymphocytes 08/21/2024 1.9  0.8 - 5.3 10e3/uL Final    Absolute Monocytes 08/21/2024 0.4  0.0 - 1.3 10e3/uL Final    Absolute Eosinophils 08/21/2024 0.0  0.0 - 0.7 10e3/uL Final    Absolute Basophils 08/21/2024 0.0  0.0 - 0.2 10e3/uL Final    Absolute Immature Granulocytes 08/21/2024 0.0  <=0.4 10e3/uL Final        Signed Electronically by: Emma Carrasco PA-C on 8/21/2024 at 9:37 AM

## 2024-08-21 NOTE — PATIENT INSTRUCTIONS
To schedule your imaging: Call [phone]     Start using Flonase 1-2 sprays in each nostril at bedtime right before you lay down to go to sleep. This can take a couple weeks before it starts to make a difference so be patient with it. I'd also recommend you use an over the counter nasal saline spray in the mornings to keep the inside of your nose moisturized.

## 2024-08-23 ENCOUNTER — ANCILLARY PROCEDURE (OUTPATIENT)
Dept: ULTRASOUND IMAGING | Facility: CLINIC | Age: 59
End: 2024-08-23
Attending: PHYSICIAN ASSISTANT
Payer: COMMERCIAL

## 2024-08-23 DIAGNOSIS — R22.1 LUMP IN NECK: ICD-10-CM

## 2024-08-23 PROCEDURE — 76536 US EXAM OF HEAD AND NECK: CPT

## 2024-09-03 NOTE — TELEPHONE ENCOUNTER
FUTURE VISIT INFORMATION      FUTURE VISIT INFORMATION:  Date: 10/25/24  Time: 12:20 PM  Location: [facility] [place] - ENT  REFERRAL INFORMATION:  Referring provider:  Arturo Acosta PA-C   Referring providers clinic:   URGENT CARE   Reason for visit/diagnosis:  dx. H69.92 (ICD-10-CM) - Dysfunction of left eustachian tube, ref by ARTURO ACOSTA, scheduled by pt., [facility] verified.     RECORDS REQUESTED FROM      Clinic name Comments Records Status Imaging Status    URGENT CARE  4/12/24 referral/ note- Arturo Acosta PA-C  Epic    EC FP/IM/PEDS    8/21/24 note- Emma Carrasco PA-C  Epic

## 2024-09-05 ENCOUNTER — HOSPITAL ENCOUNTER (OUTPATIENT)
Dept: ULTRASOUND IMAGING | Facility: CLINIC | Age: 59
Discharge: HOME | End: 2024-09-05
Attending: PHYSICIAN ASSISTANT | Admitting: PHYSICIAN ASSISTANT
Payer: COMMERCIAL

## 2024-09-05 DIAGNOSIS — R22.1 LUMP IN NECK: ICD-10-CM

## 2024-09-05 PROCEDURE — 88305 TISSUE EXAM BY PATHOLOGIST: CPT | Mod: TC | Performed by: PHYSICIAN ASSISTANT

## 2024-09-05 PROCEDURE — 88305 TISSUE EXAM BY PATHOLOGIST: CPT | Mod: 26 | Performed by: PATHOLOGY

## 2024-09-05 PROCEDURE — 88173 CYTOPATH EVAL FNA REPORT: CPT | Mod: 26 | Performed by: PATHOLOGY

## 2024-09-05 PROCEDURE — 272N000431 US BIOPSY HEAD NECK THORAX SOFT TISSUE

## 2024-09-05 PROCEDURE — 250N000009 HC RX 250: Performed by: STUDENT IN AN ORGANIZED HEALTH CARE EDUCATION/TRAINING PROGRAM

## 2024-09-05 PROCEDURE — 20206 BIOPSY MUSCLE PERQ NEEDLE: CPT

## 2024-09-05 RX ORDER — LIDOCAINE HYDROCHLORIDE 10 MG/ML
10 INJECTION, SOLUTION EPIDURAL; INFILTRATION; INTRACAUDAL; PERINEURAL ONCE
Status: COMPLETED | OUTPATIENT
Start: 2024-09-05 | End: 2024-09-05

## 2024-09-05 RX ADMIN — LIDOCAINE HYDROCHLORIDE 3 ML: 10 INJECTION, SOLUTION EPIDURAL; INFILTRATION; INTRACAUDAL; PERINEURAL at 12:52

## 2024-09-09 DIAGNOSIS — D11.9 PLEOMORPHIC ADENOMA OF SUBMANDIBULAR GLAND: Primary | ICD-10-CM

## 2024-09-09 LAB
PATH REPORT.COMMENTS IMP SPEC: NORMAL
PATH REPORT.FINAL DX SPEC: NORMAL
PATH REPORT.GROSS SPEC: NORMAL
PATH REPORT.MICROSCOPIC SPEC OTHER STN: NORMAL

## 2024-09-09 NOTE — RESULT ENCOUNTER NOTE
Idris Juárez,    Here are the results as we discussed on the phone. I put in an ENT referral for you for further discussion about next steps.     Take care,  Emma Carrasco PA-C on 9/9/2024 at 2:00 PM

## 2024-09-10 ENCOUNTER — TELEPHONE (OUTPATIENT)
Dept: OTOLARYNGOLOGY | Facility: CLINIC | Age: 59
End: 2024-09-10
Payer: COMMERCIAL

## 2024-09-10 NOTE — TELEPHONE ENCOUNTER
[facility] Call Center    Phone Message    May a detailed message be left on voicemail: yes     Reason for Call: Appointment Intake    Referring Provider Name:   EAGLE COHEN     Diagnosis and/or Symptoms: Pleomorphic adenoma of submandibular gland . Per protocols I am send this message to have it reviewed, Please call to discuss. Thank you    Action Taken: Message routed to:  [facility] ([facility] ENT    Travel Screening: Not Applicable     Date of Service:

## 2024-09-10 NOTE — TELEPHONE ENCOUNTER
Patient confirmed scheduled appointment:  Date: 9/30  Time: 10:40am  Visit type: New ENT  Provider: Jose  [place] [facility] [place]  Testing/imaging:   Additional notes:

## 2024-09-11 ENCOUNTER — TELEPHONE (OUTPATIENT)
Dept: OTOLARYNGOLOGY | Facility: CLINIC | Age: 59
End: 2024-09-11
Payer: COMMERCIAL

## 2024-09-11 DIAGNOSIS — R22.1 NECK MASS: Primary | ICD-10-CM

## 2024-09-11 NOTE — TELEPHONE ENCOUNTER
FUTURE VISIT INFORMATION      FUTURE VISIT INFORMATION:  Date: 9/30/24  Time: 10:40 AM  Location: [facility] [place] - ENT  REFERRAL INFORMATION:  Referring provider:    Referring providers clinic:    Reason for visit/diagnosis:  Pleomorphic adenoma of submandibular gland     RECORDS REQUESTED FROM      Clinic name Comments Records Status Imaging Status   EC FP/IM/PEDS    9/9/24 referral order -Emma Carrasco PA-C   8/21/24 OV notes Epic    [facility] 9/5/24 US biopsy head neck thorax  8/23/24 US head neck [facility] PACS   [facility]  9/5/2024 FNA Right: [id number]   Final Diagnosis   Specimen A.  Neck, right submandibular gland, fine-needle aspiration:              Interpretation:               Benign, Benign mixed tumor consistent with pleomorphic adenoma.     Track: [id number] Epic    Path received   9/18 09/11/2024 8:45 AM: Req for path from [facility]    Action September 18, 2024 9:29 AM Xena Dawkins Taken 9 Slides from [facility] received and taken to 5th floor path lab for review.

## 2024-09-11 NOTE — TELEPHONE ENCOUNTER
Patient confirmed scheduled appointment:  Date: 9/20  Time: 4:20pm  Visit type: CT Soft Tissue Neck  Provider:   [place] [facility] [place]:   Additional notes:

## 2024-09-19 ENCOUNTER — LAB REQUISITION (OUTPATIENT)
Dept: LAB | Facility: CLINIC | Age: 59
End: 2024-09-19
Payer: COMMERCIAL

## 2024-09-19 LAB
PATH REPORT.COMMENTS IMP SPEC: NORMAL
PATH REPORT.FINAL DX SPEC: NORMAL
PATH REPORT.GROSS SPEC: NORMAL
PATH REPORT.MICROSCOPIC SPEC OTHER STN: NORMAL
PATH REPORT.RELEVANT HX SPEC: NORMAL
PATH REPORT.RELEVANT HX SPEC: NORMAL
PATH REPORT.SITE OF ORIGIN SPEC: NORMAL

## 2024-09-19 PROCEDURE — 88323 CONSLTJ&REPRT MATRL PREP SLD: CPT | Mod: TC | Performed by: STUDENT IN AN ORGANIZED HEALTH CARE EDUCATION/TRAINING PROGRAM

## 2024-09-19 PROCEDURE — 88323 CONSLTJ&REPRT MATRL PREP SLD: CPT | Mod: 26 | Performed by: PATHOLOGY

## 2024-09-20 ENCOUNTER — ANCILLARY PROCEDURE (OUTPATIENT)
Dept: CT IMAGING | Facility: CLINIC | Age: 59
End: 2024-09-20
Attending: STUDENT IN AN ORGANIZED HEALTH CARE EDUCATION/TRAINING PROGRAM
Payer: COMMERCIAL

## 2024-09-20 DIAGNOSIS — R22.1 NECK MASS: ICD-10-CM

## 2024-09-20 PROCEDURE — 70491 CT SOFT TISSUE NECK W/DYE: CPT | Performed by: RADIOLOGY

## 2024-09-20 RX ORDER — IOPAMIDOL 755 MG/ML
90 INJECTION, SOLUTION INTRAVASCULAR ONCE
Status: COMPLETED | OUTPATIENT
Start: 2024-09-20 | End: 2024-09-20

## 2024-09-20 RX ADMIN — IOPAMIDOL 90 ML: 755 INJECTION, SOLUTION INTRAVASCULAR at 16:00

## 2024-09-20 NOTE — DISCHARGE INSTRUCTIONS

## 2024-09-23 ENCOUNTER — PATIENT OUTREACH (OUTPATIENT)
Dept: CARE COORDINATION | Facility: CLINIC | Age: 59
End: 2024-09-23
Payer: COMMERCIAL

## 2024-09-23 ENCOUNTER — PATIENT OUTREACH (OUTPATIENT)
Dept: OTOLARYNGOLOGY | Facility: CLINIC | Age: 59
End: 2024-09-23
Payer: COMMERCIAL

## 2024-09-23 DIAGNOSIS — E04.1 THYROID NODULE: Primary | ICD-10-CM

## 2024-09-23 NOTE — TELEPHONE ENCOUNTER
Called and spoke to patient regarding the following CT neck from 9/20/24:     Impression:  1. Subtle hypodensity at the inferolateral aspect of the right submandibular gland (images 2 image 70-72), may correspond to the biopsy-proven pleomorphic adenoma.  2. No cervical lymphadenopathy.  3. Subcentimeter hypoattenuating nodule in the inferior right thyroid gland. Consider dedicated ultrasound for further evaluation.    US thyroid ordered to further evaluate thyroid nodule. Patient transferred to imaging scheduling line to schedule at a time that is convenient for her.     Lexie Guillermo, RN, BSN  RN Care Coordinator, ENT Clinic

## 2024-09-30 ENCOUNTER — PREP FOR PROCEDURE (OUTPATIENT)
Dept: OTOLARYNGOLOGY | Facility: CLINIC | Age: 59
End: 2024-09-30

## 2024-09-30 ENCOUNTER — PRE VISIT (OUTPATIENT)
Dept: OTOLARYNGOLOGY | Facility: CLINIC | Age: 59
End: 2024-09-30

## 2024-09-30 ENCOUNTER — ANCILLARY PROCEDURE (OUTPATIENT)
Dept: ULTRASOUND IMAGING | Facility: CLINIC | Age: 59
End: 2024-09-30
Attending: STUDENT IN AN ORGANIZED HEALTH CARE EDUCATION/TRAINING PROGRAM
Payer: COMMERCIAL

## 2024-09-30 ENCOUNTER — OFFICE VISIT (OUTPATIENT)
Dept: OTOLARYNGOLOGY | Facility: CLINIC | Age: 59
End: 2024-09-30
Attending: PHYSICIAN ASSISTANT
Payer: COMMERCIAL

## 2024-09-30 VITALS
BODY MASS INDEX: 26.03 KG/M2 | OXYGEN SATURATION: 96 % | HEIGHT: 66 IN | SYSTOLIC BLOOD PRESSURE: 146 MMHG | DIASTOLIC BLOOD PRESSURE: 84 MMHG | HEART RATE: 79 BPM | WEIGHT: 162 LBS | TEMPERATURE: 97.2 F

## 2024-09-30 DIAGNOSIS — D11.9 PLEOMORPHIC ADENOMA OF SUBMANDIBULAR GLAND: ICD-10-CM

## 2024-09-30 DIAGNOSIS — E04.1 THYROID NODULE: ICD-10-CM

## 2024-09-30 DIAGNOSIS — D11.9 PLEOMORPHIC ADENOMA OF SUBMANDIBULAR GLAND: Primary | ICD-10-CM

## 2024-09-30 PROCEDURE — 76536 US EXAM OF HEAD AND NECK: CPT | Performed by: RADIOLOGY

## 2024-09-30 PROCEDURE — 99204 OFFICE O/P NEW MOD 45 MIN: CPT | Performed by: STUDENT IN AN ORGANIZED HEALTH CARE EDUCATION/TRAINING PROGRAM

## 2024-09-30 ASSESSMENT — PAIN SCALES - GENERAL: PAINLEVEL: MODERATE PAIN (4)

## 2024-09-30 ASSESSMENT — PATIENT HEALTH QUESTIONNAIRE - PHQ9: SUM OF ALL RESPONSES TO PHQ QUESTIONS 1-9: 8

## 2024-09-30 NOTE — NURSING NOTE
"Chief Complaint   Patient presents with    Consult     Pleomorphic adenoma of submandibular gland     Blood pressure (!) 146/84, pulse 79, temperature 97.2  F (36.2  C), height 1.676 m (5' 6\"), weight 73.5 kg (162 lb), last menstrual period 07/31/2006, SpO2 96%, not currently breastfeeding.  Flaco Royal LPN    "

## 2024-09-30 NOTE — PROGRESS NOTES
Head and neck surgery  September 30, 2024    I the pleasure meeting Mrs. Delgado today in clinic.    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  She noticed a mass present in that area in February.  She subsequently underwent a ultrasound which showed a 8 mm mass in the right submandibular gland.  Subsequent fine-needle aspirate was performed which shows pleomorphic adenoma.  This was reviewed here at the [facility] and our pathologist agree with the diagnosis.  I arrange for a CT neck which shows the subtle finding in the right submandibular gland as well as multiple small thyroid nodules.  Subsequent thyroid ultrasound was performed which shows two subcentimeter nodules in the thyroid gland.    Past medical history:  Depression    Past surgical history:  Cholecystectomy    Medications:  Bupropion  Sertraline  Rosuvastatin    Allergies:  No known drug allergies    Social history:  Never smoker  Occasional alcohol use    Family history:  None    Physical examination:  Alert in no acute distress  Facial nerve function normal  Normal lingual nerve sensation and hypoglossal nerve function.  No lesions seen in the oral cavity or oropharynx  1 cm firm but mobile mass in the right submandibular gland.  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  I discussed options of continued observation versus surgery which would be excision of the right submandibular gland.  We reviewed the proximately 3% risk of malignant transformation of pleomorphic adenoma.  Given this I recommended proceeding with surgery which she agrees.    We discussed the planned incisions as well as the risks including but not limited to infection, bleeding, return trip to the operating room, marginal branch facial nerve weakness/injury, hypoglossal nerve and lingual nerve  injury.    She wishes to proceed and we will help get her scheduled for surgery.    Augustin Garcia MD      45 minutes spent on the date of the encounter in chart review, patient visit, review of tests, documentation and/or discussion with other providers about the issues documented above.

## 2024-09-30 NOTE — PROGRESS NOTES
Teaching Flowsheet - ENT  Relevant Diagnosis: Pleomorphic adenoma of submandibular gland   Teaching Topic: Right submandibular gland resection   Person(s) involved in teaching: Patient      Motivation Level: High  Asks Questions: Yes  Eager to Learn: Yes  Cooperative: Yes  Receptive (willing/able to accept information): Yes  Comments: Reviewed pre-op H and P, NPO prior to  surgery, pre-op scrub (will be mailed by surgery schedulers), reviewed post-op cares, activity and pain.     Patient demonstrates understanding of the following:  Reason for the appointment, diagnosis and treatment plan: Yes  Knowledge of proper use of medications and conditions for which they are ordered (with special attention to potential side effects or drug interactions): stop aspirin products 1 week before surgery Yes  Which situations necessitate calling provider and whom to contact: Yes  Nutritional needs and diet plan: Yes  Pain management techniques: Yes  Patient instructed on hand hygiene: Yes  How and/when to access community resources: Yes     Infection Prevention:  Patient demonstrates understanding of the following:  Surgical procedure site care taught: Yes  Signs and symptoms of infection taught: Yes  Wound care taught: Yes  Instructional Materials Used/Given: verbal instruction, AVS with surgery information     Lexie Guillermo, RN, BSN  RN Care Coordinator, ENT Clinic

## 2024-09-30 NOTE — LETTER
9/30/2024       RE: Shanique Delgado  [address] [place] [address]     Dear Colleague,    Thank you for referring your patient, Shanique Delgado, to the [facility] [place] [facility]. Please see a copy of my visit note below.    Head and neck surgery  September 30, 2024    I the pleasure meeting Mrs. Delgado today in clinic.    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  She noticed a mass present in that area in February.  She subsequently underwent a ultrasound which showed a 8 mm mass in the right submandibular gland.  Subsequent fine-needle aspirate was performed which shows pleomorphic adenoma.  This was reviewed here at the [facility] and our pathologist agree with the diagnosis.  I arrange for a CT neck which shows the subtle finding in the right submandibular gland as well as multiple small thyroid nodules.  Subsequent thyroid ultrasound was performed which shows two subcentimeter nodules in the thyroid gland.    Past medical history:  Depression    Past surgical history:  Cholecystectomy    Medications:  Bupropion  Sertraline  Rosuvastatin    Allergies:  No known drug allergies    Social history:  Never smoker  Occasional alcohol use    Family history:  None    Physical examination:  Alert in no acute distress  Facial nerve function normal  Normal lingual nerve sensation and hypoglossal nerve function.  No lesions seen in the oral cavity or oropharynx  1 cm firm but mobile mass in the right submandibular gland.  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  I discussed options of continued observation versus surgery which would be excision of the right submandibular gland.  We reviewed the proximately 3% risk of malignant  transformation of pleomorphic adenoma.  Given this I recommended proceeding with surgery which she agrees.    We discussed the planned incisions as well as the risks including but not limited to infection, bleeding, return trip to the operating room, marginal branch facial nerve weakness/injury, hypoglossal nerve and lingual nerve injury.    She wishes to proceed and we will help get her scheduled for surgery.    Augustin Garcia MD      45 minutes spent on the date of the encounter in chart review, patient visit, review of tests, documentation and/or discussion with other providers about the issues documented above.     Again, thank you for allowing me to participate in the care of your patient.      Sincerely,    Augustin Garcia MD

## 2024-09-30 NOTE — PATIENT INSTRUCTIONS
You were seen in the ENT Clinic today by Dr. Garcia. If you have any questions or concerns after your appointment, please contact us (see below)    The following has been recommended for you based upon your appointment today:  Surgery schedulers will call you to schedule your surgery and post op follow up with Dr. Garcia     Please return to clinic 1 week after surgery for post op follow up with Dr. Garcia     How to Contact Us:  Send a [employer] message to your provider. Our team will respond to you via [employer]. Occasionally, we will need to call you to get further information.  For urgent matters (Monday-Friday), call the ENT Clinic: [phone] and speak with a call center team member - they will route your call appropriately.   If you'd like to speak directly with a nurse, please find our contact information below. We do our best to check voicemail frequently throughout the day, and will work to call you back within 1-2 days. For urgent matters, please use the general clinic phone numbers listed above.    Lexie CARRANZA RN, BSN  RN Care Coordinator, ENT Clinic  [facility]  Direct: [phone]           Surgery Instructions     PREPARING FOR SURGERY  You will need a preoperative assessment within 30 days of your surgery. This can be with your primary care doctor or our PAC (Anesthesia) clinic. If your preoperative assessment is done outside of the [facility] system please have it faxed to #[phone].   Before surgery, call the clinic:   If there is any change in your health, or you are having more frequent or severe symptoms   If you develop a cold or flu, or if you test positive for COVID-19   If you have any questions about what to expect before, during, or after surgery   If you need assistance filling out paperwork for short-term disability or FMLA, or you need a letter excusing you from work       MEDICATIONS BEFORE SURGERY  You will receive specific instructions about how to take  your medications at your preoperative assessment visit. Talk to your care team about every medication that you take, including over-the-counter medications and supplements. Some medications can make you bleed too much during surgery, and some change how well anesthesia drugs work. Follow these general instructions for common medications, unless otherwise directed.     INSTRUCTIONS MEDICATION   Hold for 7 days before surgery  Supplements   Multivitamins   Aspirin (note: some medications can contain aspirin, like [facility])  Naproxen (Aleve)  Ibuprofen (Advil, Motrin)  Any weight loss medication      Talk with the Preoperative Assessment Center (PAC) about how to take these medications before surgery    Insulin or oral medications for diabetes   Blood pressure medications   Anticoagulant medications, including:    warfarin (Coumadin)   enoxaparin (Lovenox)   dabigatran (Pradaxa)   apixaban (Eliquis)   rivaroxaban (Xarelto)   Antiplatelet medications, including:   clopidogrel bisulfate (Plavix)   cilostazol (Pletal)      Okay to keep taking for pain, as needed    Acetaminophen (Tylenol)        EATING AND DRINKING BEFORE SURGERY  Eat and drink as usual until 8 hours before your surgery arrival time. After that, no food or milk.   Drink clear liquids until 1 hour before your surgery arrival time. Clear liquids are liquids you can see through, like water, Gatorade, and Propel. You may also have black coffee and tea (no cream or milk).   Nothing by mouth within 1 hour of your surgery arrival time. This includes gum, candy, and breath mints.   If your care team tells you take medicine on the morning of surgery, it is okay to take medicine with a sip of water.   Do not drink alcohol for at least 24 hours before surgery. Do not use marijuana for 7 days prior to surgery.      PREVENTING INFECTION  Shower or bathe the night before and morning of your surgery following the instructions on your handout,  Showering Before  Surgery.    Note: Only use the special antiseptic soap from your neck to your toes. Your care team will clean the skin at your surgery site.   Don t shave or clip hair near your surgery site. We ll remove the hair if needed.   Having diabetes and/or smoking can cause delayed wound healing and increase your risk of a surgical site infection. Quitting smoking and lowering your blood sugars can make a big difference. If you would like support with this, please let us know or work with your primary care provider.        SMOKING AND NICOTINE  Don t smoke or vape the morning of surgery. You may chew nicotine gum up to 2 hours before surgery. A nicotine patch is okay.   We strongly recommend quitting smoking and other tobacco products. Nicotine can cause delayed healing and wound complications after surgery.       PLANNING AHEAD - FINANCES  [email or web address]  [facility] Billing: Please call [phone], if you wish to pay a bill.  [facility] Financial Counselors: Please call [phone], if you have questions about possible costs and coverage or to discuss options if you don't have enough - or no - insurance for your care.  [facility] Cost of Care Estimates: Please visit the website to view our online estimate tool. If you have additional questions, call [phone] for estimates.  Our financial team will contact your insurance company as soon as surgery is scheduled. If your insurance company requires a prior authorization (pre-approval), our financial team will complete these necessary steps. Typically, we don t encounter many issues with insurance denying coverage for skull base surgery.    It is a good idea to call your insurance company and let them know you re having surgery. Ask questions about your deductible, co-insurance, and what of out-of-pocket costs you will be responsible for.       HEALTHCARE DIRECTIVE  Consider preparing a Health  Care Directive and choosing a Health Care Agent. A Health Care Directive is a written plan outlining your values and priorities for your future medical treatment. A Health Care Agent makes health care decisions based on your wishes if you are unable to communicate.   Download a document, information materials or register for a free class on advance care planning and creating a health care directive by visiting [email or web address], calling [phone], or emailing [email or web address].   If you have a health care directive or choose to complete a healthcare directive before surgery, please upload the document to [employer]. This will be attached to your chart. You may also want to bring a copy with you on the day of surgery.       YOUR SURGERY DAY  Parking:   Both self-park and  parking are available at the [facility] buildings. If the Patient Visitors Ramp is full or if a patient or visitor has limited mobility, please follow the signs to the  located at the main entrance. For more information, please visit: [email or web address]    What to bring:  Photo ID and insurance card   Copy of your healthcare directive (if you have one)   Glasses with case (you can t wear contacts during surgery)   If you have a pacemaker, ICD (defibrillator) or other implant, please bring the ID card   If you have an implanted stimulator, please bring the remote control   If you have a legal guardian, bring a copy of the certified (court-stamped) guardianship papers   What to leave at home:  Please remove any jewelry, including body piercings   Leave jewelry and other valuables at home        HOME RECOVERY  Everyone's recovery is different based on their health condition, age, and overall health status.   Plan to have an adult stay with you for at least 24 hours after you get home from the hospital.   Arrange for help at home. It is common to feel tired for  the first few weeks (maybe months) and you will need to avoid some activities. Many people find that having someone help with household tasks, such as cleaning, cooking, and running errands is helpful.    Make your home safe by removing tripping hazards and moving items you need to a place where you can easily reach them.        ACTIVITY RESTRICTIONS  Avoid strenuous exercise and activity for 3-4 weeks.   Do not lift anything greater than 10 pounds (about a gallon of milk).      INCISION CARE  Usually, you will have a gauze bandage over your incision after surgery. Follow any instructions that are given to you at the time of discharge from the hospital.      PAIN MANAGEMENT  It is normal to have some pain after surgery. Most people do not experience severe pain. If you are experiencing severe pain at the incision site or severe headaches, please let us know right away.  You will usually be sent home with a small amount of narcotic pain medication. You should gradually reduce the amount of pain medication that you take as your pain improves.  Avoid ibuprofen (Advil) or naproxen (Aleve) immediately after surgery, unless your surgeon tells you this is okay to take.   It is okay to take acetaminophen (Tylenol) for pain. You can take Tylenol with the narcotic pain medication, and some people find benefit in alternating between the narcotic pain medication and Tylenol. (Example: 1 tablet of oxycodone at 8:00 am, 1 tablet of Tylenol at 10:00 am).      DIET  A well-balanced diet is important for your recovery.   Try to eat plenty of fiber (fruits, whole grains, beans, and vegetables can be good sources of fiber) to help prevent constipation.   It is important to stay hydrated after surgery to prevent dehydration and help with bowel movements. Drink plenty of water throughout the day.      WORK AND FMLA / SHORT TERM DISABILITY  Most people take 1-4 weeks off work. This will depend on the type of work that you do and the  surgery you are having.  We are happy to complete short term disability or FMLA paperwork for you. Please send a [employer] message (you can attach a document) with the paperwork that you need completed. Please let us know where you'd like for us to send the paperwork. We can send it directly to your leave  or employer, with your permission, or we can send it back to you.  Please allow at least 5-7 business days for paperwork completion and processing.       CALL THE CLINIC IF YOU EXPERIENCE:  Drainage, swelling, fluid collection, or increased redness around the incision   Fever, or temperature of 101 degrees or higher   Pain not managed by your pain medication   Severe constipation or abdominal pain  Running low on prescription medication that was prescribed to you at the time of surgery   Any other symptoms that you have questions about      After clinic hours or on the weekends, please call the hospital  at [phone] and ask to speak with the ENT resident on call. If you have a serious emergency, call 911 or come to the emergency room. If you can, come to the hospital where you had your surgery.     During clinic hours:   Department  Phone    Ear, Nose, & Throat (ENT)    [phone]     RN Care Coordinators:  We do our best to check voicemail frequently throughout the day. For urgent matters, please use the general clinic phone number listed above. Your call will be routed appropriately.     Lexie CARRANZA RN, BSN   RN Care Coordinator ENT    Direct: [phone]

## 2024-10-07 ENCOUNTER — MYC REFILL (OUTPATIENT)
Dept: FAMILY MEDICINE | Facility: CLINIC | Age: 59
End: 2024-10-07
Payer: COMMERCIAL

## 2024-10-07 DIAGNOSIS — F32.1 MODERATE SINGLE CURRENT EPISODE OF MAJOR DEPRESSIVE DISORDER (H): ICD-10-CM

## 2024-10-07 DIAGNOSIS — F90.0 ATTENTION DEFICIT HYPERACTIVITY DISORDER (ADHD), PREDOMINANTLY INATTENTIVE TYPE: ICD-10-CM

## 2024-10-07 DIAGNOSIS — F41.1 ANXIETY STATE: ICD-10-CM

## 2024-10-07 RX ORDER — DEXMETHYLPHENIDATE HYDROCHLORIDE 10 MG/1
10 CAPSULE, EXTENDED RELEASE ORAL DAILY
Qty: 30 CAPSULE | Refills: 0 | Status: SHIPPED | OUTPATIENT
Start: 2024-10-07

## 2024-10-07 RX ORDER — SERTRALINE HYDROCHLORIDE 100 MG/1
100 TABLET, FILM COATED ORAL DAILY
Qty: 90 TABLET | Refills: 3 | Status: CANCELLED | OUTPATIENT
Start: 2024-10-07

## 2024-10-18 ENCOUNTER — LAB (OUTPATIENT)
Dept: LAB | Facility: CLINIC | Age: 59
End: 2024-10-18
Attending: PHYSICIAN ASSISTANT
Payer: COMMERCIAL

## 2024-10-18 DIAGNOSIS — E78.2 MIXED HYPERLIPIDEMIA: Primary | ICD-10-CM

## 2024-10-18 DIAGNOSIS — E04.1 THYROID NODULE: ICD-10-CM

## 2024-10-18 LAB — TSH SERPL DL<=0.005 MIU/L-ACNC: 3.3 UIU/ML (ref 0.3–4.2)

## 2024-10-18 PROCEDURE — 36415 COLL VENOUS BLD VENIPUNCTURE: CPT

## 2024-10-18 PROCEDURE — 84443 ASSAY THYROID STIM HORMONE: CPT

## 2024-10-21 NOTE — RESULT ENCOUNTER NOTE
Shanique,    Thyroid function is normal. Please let me know if you have any further questions.    Take care,  Emma Carrasco PA-C on 10/21/2024 at 6:52 AM

## 2024-10-24 DIAGNOSIS — Z01.10 ENCOUNTER FOR HEARING EXAMINATION: Primary | ICD-10-CM

## 2024-10-25 ENCOUNTER — OFFICE VISIT (OUTPATIENT)
Dept: AUDIOLOGY | Facility: CLINIC | Age: 59
End: 2024-10-25
Payer: COMMERCIAL

## 2024-10-25 ENCOUNTER — PRE VISIT (OUTPATIENT)
Dept: OTOLARYNGOLOGY | Facility: CLINIC | Age: 59
End: 2024-10-25

## 2024-10-25 ENCOUNTER — OFFICE VISIT (OUTPATIENT)
Dept: OTOLARYNGOLOGY | Facility: CLINIC | Age: 59
End: 2024-10-25
Payer: COMMERCIAL

## 2024-10-25 VITALS
SYSTOLIC BLOOD PRESSURE: 138 MMHG | HEART RATE: 83 BPM | BODY MASS INDEX: 26.36 KG/M2 | WEIGHT: 164 LBS | HEIGHT: 66 IN | OXYGEN SATURATION: 95 % | DIASTOLIC BLOOD PRESSURE: 85 MMHG

## 2024-10-25 DIAGNOSIS — H93.8X3 EAR PRESSURE, BILATERAL: Primary | ICD-10-CM

## 2024-10-25 DIAGNOSIS — Z01.10 ENCOUNTER FOR HEARING EXAMINATION: ICD-10-CM

## 2024-10-25 DIAGNOSIS — H69.92 DYSFUNCTION OF LEFT EUSTACHIAN TUBE: ICD-10-CM

## 2024-10-25 DIAGNOSIS — M26.609 TMJ (TEMPOROMANDIBULAR JOINT SYNDROME): Primary | ICD-10-CM

## 2024-10-25 PROCEDURE — 92550 TYMPANOMETRY & REFLEX THRESH: CPT | Performed by: AUDIOLOGIST

## 2024-10-25 PROCEDURE — 92557 COMPREHENSIVE HEARING TEST: CPT | Performed by: AUDIOLOGIST

## 2024-10-25 PROCEDURE — 99214 OFFICE O/P EST MOD 30 MIN: CPT | Performed by: PHYSICIAN ASSISTANT

## 2024-10-25 RX ORDER — METHYLPREDNISOLONE 4 MG/1
TABLET ORAL
Qty: 21 TABLET | Refills: 0 | Status: SHIPPED | OUTPATIENT
Start: 2024-10-25

## 2024-10-25 NOTE — PROGRESS NOTES
[facility] EAR NOSE AND THROAT CLINIC [address] [address]  Phone: [phone]  Fax: [phone]    Patient:  Shanique Delgado, Date of birth 1965  Date of Visit:  Oct 25, 2024  Referring Provider Herber Acosta      Assessment & Plan      Dysfunction of left eustachian tube  Discussed that exam and audiogram do not suggest active ETD. However she can try Afrin for a short course.  - Adult ENT  Referral    TMJ (temporomandibular joint syndrome)  TMJ is likely the cause of her symptoms. Gave a short course of steroids to help with immediate relief and physical therapy referral sent for TMJ therapy.  - methylPREDNISolone (MEDROL DOSEPAK) 4 MG tablet therapy pack  Dispense: 21 tablet; Refill: 0  - Physical Therapy  Referral      Review of external notes as documented elsewhere in note  30 minutes spent by me on the date of the encounter doing chart review, history and exam, documentation and further activities per the note      History of Present Illness     Pertinent history obtain from: chart review and patient    Patient reports having COVID twice in 2023.  At this time she noted pain in her ear when laying that shifting positions.  Symptoms improved after a while.  In February she got a URI and her ears felt like there was something in them.  Her ears felt full but she could pop her ears.  Hot compresses also help. She finds her symptoms worsen if it is humid or there are increased allergens.  She had to stop Pilates because she would get dizzy going from laying to sitting.  Her symptoms are always in her left ear but they come and go on the right.  Main symptoms are pressure and pain for short period of time.  She has tried Flonase and decongestants which were not very helpful.  Steroids but temporary relief.  She does clench and grind her teeth.      PHYSICAL EXAM:  /85 (BP Location: Right arm, Patient Position: Sitting,  "Cuff Size: Adult Regular)   Pulse 83   Ht 1.676 m (5' 6\")   Wt 74.4 kg (164 lb)   LMP 07/31/2006   SpO2 95%   BMI 26.47 kg/m    Constitutional:  The patient was unaccompanied, well-groomed, and in no acute distress.     Skin: Normal:  warm and pink without rash    Neurologic: Alert and oriented x 3. Cooperative.    Psychiatric: The patient's affect was calm, cooperative, and appropriate.     Communication:  Normal; communicates verbally, voice quality WNL   Respiratory: Breathing comfortably without stridor or exertion of accessory muscles   Head/Face:  Normocephalic and atraumatic.  No lesions or scars.    Eyes: Extraocular movement intact. Clear sclera.    Ears: Pinnae and tragus non-tender. No external deformity, hearing normal to conversational voice, canals and TM's are clear and intact bilaterally    Nose: No anterior drainage, no external deformity   Oral Cavity: Normal tongue, adequate dentition, moist.   Neck: Supple with normal laryngeal and tracheal landmarks. Normal range of motion.           Audiogram: 10/25/2024 - data independently reviewed  Right ear: Normal hearing   Left ear: Normal hearing   SRT right: 15 dB left: 20 dB   WR right: 100% left: 100%   Acoustic Reflexes: present in all conditions  Tympanograms: type A right, type A left       Bernie Florence PA-C   Otolaryngology-Head & Neck Surgery           "

## 2024-10-25 NOTE — LETTER
10/25/2024       RE: Shanique Delgado  [address] [place] [address]     Dear Colleague,    Thank you for referring your patient, Shanique Delgado, to the [facility] EAR NOSE AND THROAT CLINIC [place] [facility]. Please see a copy of my visit note below.      [facility] EAR NOSE AND THROAT CLINIC [place] [id number] [facility]  4TH FLOOR  [facility] [address]  Phone: [phone]  Fax: [phone]    Patient:  Shanique Delgado, Date of birth 1965  Date of Visit:  Oct 25, 2024  Referring Provider Herber Acosta      Assessment & Plan     Dysfunction of left eustachian tube  Discussed that exam and audiogram do not suggest active ETD. However she can try Afrin for a short course.  - Adult ENT  Referral    TMJ (temporomandibular joint syndrome)  TMJ is likely the cause of her symptoms. Gave a short course of steroids to help with immediate relief and physical therapy referral sent for TMJ therapy.  - methylPREDNISolone (MEDROL DOSEPAK) 4 MG tablet therapy pack  Dispense: 21 tablet; Refill: 0  - Physical Therapy  Referral      Review of external notes as documented elsewhere in note  30 minutes spent by me on the date of the encounter doing chart review, history and exam, documentation and further activities per the note      History of Present Illness    Pertinent history obtain from: chart review and patient    Patient reports having COVID twice in 2023.  At this time she noted pain in her ear when laying that shifting positions.  Symptoms improved after a while.  In February she got a URI and her ears felt like there was something in them.  Her ears felt full but she could pop her ears.  Hot compresses also help. She finds her symptoms worsen if it is humid or there are increased allergens.  She had to stop Pilates because she would get dizzy going from laying to sitting.  Her symptoms are always in  "her left ear but they come and go on the right.  Main symptoms are pressure and pain for short period of time.  She has tried Flonase and decongestants which were not very helpful.  Steroids but temporary relief.  She does clench and grind her teeth.      PHYSICAL EXAM:  /85 (BP Location: Right arm, Patient Position: Sitting, Cuff Size: Adult Regular)   Pulse 83   Ht 1.676 m (5' 6\")   Wt 74.4 kg (164 lb)   LMP 07/31/2006   SpO2 95%   BMI 26.47 kg/m    Constitutional:  The patient was unaccompanied, well-groomed, and in no acute distress.     Skin: Normal:  warm and pink without rash    Neurologic: Alert and oriented x 3. Cooperative.    Psychiatric: The patient's affect was calm, cooperative, and appropriate.     Communication:  Normal; communicates verbally, voice quality WNL   Respiratory: Breathing comfortably without stridor or exertion of accessory muscles   Head/Face:  Normocephalic and atraumatic.  No lesions or scars.    Eyes: Extraocular movement intact. Clear sclera.    Ears: Pinnae and tragus non-tender. No external deformity, hearing normal to conversational voice, canals and TM's are clear and intact bilaterally    Nose: No anterior drainage, no external deformity   Oral Cavity: Normal tongue, adequate dentition, moist.   Neck: Supple with normal laryngeal and tracheal landmarks. Normal range of motion.           Audiogram: 10/25/2024 - data independently reviewed  Right ear: Normal hearing   Left ear: Normal hearing   SRT right: 15 dB left: 20 dB   WR right: 100% left: 100%   Acoustic Reflexes: present in all conditions  Tympanograms: type A right, type A left       Bernie Florence PA-C   Otolaryngology-Head & Neck Surgery               Again, thank you for allowing me to participate in the care of your patient.      Sincerely,    Bernie Florence PA-C    "

## 2024-10-25 NOTE — PATIENT INSTRUCTIONS
You were seen in the ENT Clinic today by Bernie Florence. If you have any questions or concerns after your appointment, please contact us (see below)    The following has been recommended for you based upon your appointment today:  Medrol dosepak  Afrin in both nostrils for no more than 5 days  TMJ PT with Keri Apodaca, PT    Please return to clinic as needed    How to Contact Us:  Send a [employer] message to your provider. Our team will respond to you via [employer]. Occasionally, we will need to call you to get further information.  For urgent matters (Monday-Friday), call the ENT Clinic: [phone] and speak with a call center team member - they will route your call appropriately.   If you'd like to speak directly with a nurse, please find our contact information below. We do our best to check voicemail frequently throughout the day, and will work to call you back within 1-2 days. For urgent matters, please use the general clinic phone numbers listed above.      Mary Alice ZAMORA LPN  Direct: [phone]

## 2024-10-25 NOTE — PROGRESS NOTES
AUDIOLOGY REPORT    SUMMARY: Audiology visit completed. See audiogram for results.      RECOMMENDATIONS: Follow-up with ENT.    Jabari Vanegas, [facility] [place]  Licensed Audiologist  [place] License #[id number]

## 2024-11-01 ENCOUNTER — PATIENT OUTREACH (OUTPATIENT)
Dept: CARE COORDINATION | Facility: CLINIC | Age: 59
End: 2024-11-01
Payer: COMMERCIAL

## 2024-11-11 ENCOUNTER — OFFICE VISIT (OUTPATIENT)
Dept: FAMILY MEDICINE | Facility: CLINIC | Age: 59
End: 2024-11-11
Payer: COMMERCIAL

## 2024-11-11 VITALS
WEIGHT: 165 LBS | TEMPERATURE: 97.6 F | OXYGEN SATURATION: 98 % | HEIGHT: 66 IN | DIASTOLIC BLOOD PRESSURE: 83 MMHG | BODY MASS INDEX: 26.52 KG/M2 | RESPIRATION RATE: 14 BRPM | HEART RATE: 68 BPM | SYSTOLIC BLOOD PRESSURE: 128 MMHG

## 2024-11-11 DIAGNOSIS — D11.9 PLEOMORPHIC ADENOMA OF SUBMANDIBULAR GLAND: ICD-10-CM

## 2024-11-11 DIAGNOSIS — F32.1 MODERATE SINGLE CURRENT EPISODE OF MAJOR DEPRESSIVE DISORDER (H): ICD-10-CM

## 2024-11-11 DIAGNOSIS — Z01.818 PREOP GENERAL PHYSICAL EXAM: Primary | ICD-10-CM

## 2024-11-11 DIAGNOSIS — Z23 NEEDS FLU SHOT: ICD-10-CM

## 2024-11-11 DIAGNOSIS — F90.0 ATTENTION DEFICIT HYPERACTIVITY DISORDER (ADHD), PREDOMINANTLY INATTENTIVE TYPE: ICD-10-CM

## 2024-11-11 DIAGNOSIS — E78.2 MIXED HYPERLIPIDEMIA: ICD-10-CM

## 2024-11-11 LAB
ALBUMIN SERPL BCG-MCNC: 4.5 G/DL (ref 3.5–5.2)
ALP SERPL-CCNC: 63 U/L (ref 40–150)
ALT SERPL W P-5'-P-CCNC: 34 U/L (ref 0–50)
ANION GAP SERPL CALCULATED.3IONS-SCNC: 10 MMOL/L (ref 7–15)
AST SERPL W P-5'-P-CCNC: 24 U/L (ref 0–45)
BILIRUB SERPL-MCNC: 0.5 MG/DL
BUN SERPL-MCNC: 13.5 MG/DL (ref 6–20)
CALCIUM SERPL-MCNC: 10 MG/DL (ref 8.8–10.4)
CHLORIDE SERPL-SCNC: 104 MMOL/L (ref 98–107)
CREAT SERPL-MCNC: 0.85 MG/DL (ref 0.51–0.95)
EGFRCR SERPLBLD CKD-EPI 2021: 79 ML/MIN/1.73M2
GLUCOSE SERPL-MCNC: 99 MG/DL (ref 70–99)
HCO3 SERPL-SCNC: 26 MMOL/L (ref 22–29)
HGB BLD-MCNC: 13.5 G/DL (ref 11.7–15.7)
POTASSIUM SERPL-SCNC: 4.3 MMOL/L (ref 3.4–5.3)
PROT SERPL-MCNC: 6.9 G/DL (ref 6.4–8.3)
SODIUM SERPL-SCNC: 140 MMOL/L (ref 135–145)

## 2024-11-11 PROCEDURE — 36415 COLL VENOUS BLD VENIPUNCTURE: CPT | Performed by: PHYSICIAN ASSISTANT

## 2024-11-11 PROCEDURE — 80053 COMPREHEN METABOLIC PANEL: CPT | Performed by: PHYSICIAN ASSISTANT

## 2024-11-11 PROCEDURE — 85018 HEMOGLOBIN: CPT | Performed by: PHYSICIAN ASSISTANT

## 2024-11-11 PROCEDURE — 99214 OFFICE O/P EST MOD 30 MIN: CPT | Mod: 25 | Performed by: PHYSICIAN ASSISTANT

## 2024-11-11 PROCEDURE — 90471 IMMUNIZATION ADMIN: CPT | Performed by: PHYSICIAN ASSISTANT

## 2024-11-11 PROCEDURE — 90673 RIV3 VACCINE NO PRESERV IM: CPT | Performed by: PHYSICIAN ASSISTANT

## 2024-11-11 RX ORDER — LORATADINE 10 MG/1
TABLET ORAL
COMMUNITY

## 2024-11-11 RX ORDER — OMEPRAZOLE/SODIUM BICARBONATE 20MG-1.1G
CAPSULE ORAL
COMMUNITY

## 2024-11-11 ASSESSMENT — PAIN SCALES - GENERAL: PAINLEVEL_OUTOF10: NO PAIN (0)

## 2024-11-11 NOTE — PATIENT INSTRUCTIONS
How to Take Your Medication Before Surgery  Preoperative Medication Instructions   - hold focalin day of surgery  - OK to continue wellbutrin, zoloft and rosuvastatin as usual   - no NSAID medications (ibuprofen, advil, aleve, naproxen, etc) 1 week prior to surgery, OK to take tylenol if you have pain        Patient Education   Preparing for Your Surgery  For Adults  Getting started  In most cases, a nurse will call to review your health history and instructions. They will give you an arrival time based on your scheduled surgery time. Please be ready to share:  Your doctor's clinic name and phone number  Your medical, surgical, and anesthesia history  A list of allergies and sensitivities  A list of medicines, including herbal treatments and over-the-counter drugs  Whether the patient has a legal guardian (ask how to send us the papers in advance)  Note: You may not receive a call if you were seen at our PAC (Preoperative Assessment Center).  Please tell us if you're pregnant--or if there's any chance you might be pregnant. Some surgeries may injure a fetus (unborn baby), so they require a pregnancy test. Surgeries that are safe for a fetus don't always need a test, and you can choose whether to have one.   Preparing for surgery  Within 10 to 30 days of surgery: Have a pre-op exam (sometimes called an H&P, or History and Physical). This can be done at a clinic or pre-operative center.  If you're having a , you may not need this exam. Talk to your care team.  At your pre-op exam, talk to your care team about all medicines you take. (This includes CBD oil and any drugs, such as THC, marijuana, and other forms of cannabis.) If you need to stop any medicine before surgery, ask when to start taking it again.  This is for your safety. Many medicines and drugs can make you bleed too much during surgery. Some change how well surgery (anesthesia) drugs work.  Call your insurance company to let them know you're  having surgery. (If you don't have insurance, call [phone].)  Call your clinic if there's any change in your health. This includes a scrape or scratch near the surgery site, or any signs of a cold (sore throat, runny nose, cough, rash, fever).  Eating and drinking guidelines  For your safety: Unless your surgeon tells you otherwise, follow the guidelines below.  Eat and drink as normal until 8 hours before you arrive for surgery. After that, no food or milk. You can spit out gum when you arrive.  Drink clear liquids until 2 hours before you arrive. These are liquids you can see through, like water, Gatorade, and Propel Water. They also include plain black coffee and tea (no cream or milk).  No alcohol for 24 hours before you arrive. The night before surgery, stop any drinks that contain THC.  If your care team tells you to take medicine on the morning of surgery, it's okay to take it with a sip of water. No other medicines or drugs are allowed (including CBD oil)--follow your care team's instructions.  If you have questions the day of surgery, call your hospital or surgery center.   Preventing infection  Shower or bathe the night before and the morning of surgery. Follow the instructions your clinic gave you. (If no instructions, use regular soap.)  Don't shave or clip hair near your surgery site. We'll remove the hair if needed.  Don't smoke or vape the morning of surgery. No chewing tobacco for 6 hours before you arrive. A nicotine patch is okay. You may spit out nicotine gum when you arrive.  For some surgeries, the surgeon will tell you to fully quit smoking and nicotine.  We will make every effort to keep you safe from infection. We will:  Clean our hands often with soap and water (or an alcohol-based hand rub).  Clean the skin at your surgery site with a special soap that kills germs.  Give you a special gown to keep you warm. (Cold raises the risk of infection.)  Wear hair covers, masks, gowns, and  gloves during surgery.  Give antibiotic medicine, if prescribed. Not all surgeries need this medicine.  What to bring on the day of surgery  Photo ID and insurance card  Copy of your health care directive, if you have one  Glasses and hearing aids (bring cases)  You can't wear contacts during surgery  Inhaler and eye drops, if you use them (tell us about these when you arrive)  CPAP machine or breathing device, if you use them  A few personal items, if spending the night  If you have . . .  A pacemaker, ICD (cardiac defibrillator), or other implant: Bring the ID card.  An implanted stimulator: Bring the remote control.  A legal guardian: Bring a copy of the certified (court-stamped) guardianship papers.  Please remove any jewelry, including body piercings. Leave jewelry and other valuables at home.  If you're going home the day of surgery  You must have a responsible adult drive you home. They should stay with you overnight as well.  If you don't have someone to stay with you, and you aren't safe to go home alone, we may keep you overnight. Insurance often won't pay for this.  After surgery  If it's hard to control your pain or you need more pain medicine, please call your surgeon's office.  Questions?   If you have any questions for your care team, list them here:   ____________________________________________________________________________________________________________________________________________________________________________________________________________________________________________________________  For informational purposes only. Not to replace the advice of your health care provider. Copyright   2003, 2019 [facility]. All rights reserved. Clinically reviewed by Gaston Crowe MD. [email or web address] [id number] - REV 08/24.

## 2024-11-11 NOTE — RESULT ENCOUNTER NOTE
Shanique,    I have reviewed your lab results below:    - electrolytes, blood sugar, kidney and liver function normal   - hemoglobin is normal (no anemia)    Please let me know if you have any further questions.    Take care,  Emma Carrasco PA-C on 11/11/2024 at 1:34 PM

## 2024-11-11 NOTE — PROGRESS NOTES
Preoperative Evaluation  [address] [address]  Phone: [phone]  Primary Provider: Emma Alvarado PA-C  Pre-op Performing Provider: Emma Alvarado PA-C  Nov 11, 2024 11/7/2024   Surgical Information   What procedure is being done? Removal of pleomorphic adenoma submandibular    Facility or Hospital where procedure/surgery will be performed: [facility]   Who is doing the procedure / surgery? Augustin Cisnerosemmanuel    Date of surgery / procedure: 12/03/2024    Time of surgery / procedure: 8.30 AM    Where do you plan to recover after surgery? at home with family        Patient-reported     Fax number for surgical facility: Note does not need to be faxed, will be available electronically in Epic.    Assessment & Plan     The proposed surgical procedure is considered INTERMEDIATE risk.    Preop general physical exam  - Hemoglobin  - Comprehensive metabolic panel (BMP + Alb, Alk Phos, ALT, AST, Total. Bili, TP)    Pleomorphic adenoma of submandibular gland  Reason for surgery    Attention deficit hyperactivity disorder (ADHD), predominantly inattentive type  Stable.     Moderate single current episode of major depressive disorder (H)  Stable.     Mixed hyperlipidemia  Stable.     Needs flu shot  - INFLUENZA VACCINE TRIVALENT(FLUBLOK)        - No identified additional risk factors other than previously addressed    Antiplatelet or Anticoagulation Medication Instructions   - Patient is on no antiplatelet or anticoagulation medications.    Additional Medication Instructions  Take all scheduled medications on the day of surgery EXCEPT for modifications listed below:   - Statins: Continue taking on the day of surgery.    - Psychostimulants: Hold the day of surgery   - SSRIs, SNRIs, TCAs, Antipsychotics: Continue without modification.     Recommendation  Approval given to proceed with proposed procedure, without  further diagnostic evaluation.    Emma Carrasco PA-C on 11/11/2024 at 8:59 AM      Subjective   Shanique is a 59 year old, presenting for the following:  Pre-Op Exam          11/11/2024     8:54 AM   Additional Questions   Roomed by Odette WILLS related to upcoming procedure:     Shanique Delgado is a 59 year old female who presents for preop exam undergoing right submandibular gland resection for treatment of pleomorphic adenoma. She is feeling well overall in her usual state of health without recent illness.         11/7/2024   Pre-Op Questionnaire   Have you ever had a heart attack or stroke? No    Have you ever had surgery on your heart or blood vessels, such as a stent placement, a coronary artery bypass, or surgery on an artery in your head, neck, heart, or legs? No    Do you have chest pain with activity? No    Do you have a history of heart failure? No    Do you currently have a cold, bronchitis or symptoms of other infection? No    Do you have a cough, shortness of breath, or wheezing? No    Do you or anyone in your family have previous history of blood clots? No    Do you or does anyone in your family have a serious bleeding problem such as prolonged bleeding following surgeries or cuts? No    Have you ever had problems with anemia or been told to take iron pills? No    Have you had any abnormal blood loss such as black, tarry or bloody stools, or abnormal vaginal bleeding? No    Have you ever had a blood transfusion? No    Are you willing to have a blood transfusion if it is medically needed before, during, or after your surgery? Yes    Have you or any of your relatives ever had problems with anesthesia? No    Do you have sleep apnea, excessive snoring or daytime drowsiness? No    Do you have any artifical heart valves or other implanted medical devices like a pacemaker, defibrillator, or continuous glucose monitor? No    Do you have artificial joints? No    Are you allergic to latex? No         Patient-reported     Health Care Directive  Patient does not have a Health Care Directive: Discussed advance care planning with patient; information given to patient to review.    Preoperative Review of    reviewed - controlled substances reflected in medication list.    Status of Chronic Conditions:  See problem list for active medical problems.  Problems all longstanding and stable, except as noted/documented.  See ROS for pertinent symptoms related to these conditions.    Patient Active Problem List    Diagnosis Date Noted    Pleomorphic adenoma of submandibular gland 11/11/2024     Priority: Medium    Mixed hyperlipidemia 05/05/2023     Priority: Medium    Mammogram declined 10/10/2019     Priority: Medium    Controlled substance agreement signed 01/17/2019     Priority: Medium    Attention deficit hyperactivity disorder (ADHD), predominantly inattentive type 12/19/2018     Priority: Medium     Diagnosed 2018 at [facility]      Moderate single current episode of major depressive disorder (H) 08/10/2016     Priority: Medium    Anxiety state 04/05/2006     Priority: Medium     Problem list name updated by automated process. Provider to review        Past Medical History:   Diagnosis Date    Depressive disorder     IUD (intrauterine device) in place     Mirena    Major depression      Past Surgical History:   Procedure Laterality Date    CHOLECYSTECTOMY  01/01/1997    COLONOSCOPY      EYE SURGERY  2010    Lasik     Current Outpatient Medications   Medication Sig Dispense Refill    buPROPion (WELLBUTRIN XL) 300 MG 24 hr tablet TAKE 1 TABLET BY MOUTH EVERY DAY IN THE MORNING 90 tablet 0    dexmethylphenidate (FOCALIN XR) 10 MG 24 hr capsule Take 1 capsule (10 mg) by mouth daily. 30 capsule 0    dexmethylphenidate (FOCALIN XR) 10 MG 24 hr capsule Take 1 capsule (10 mg) by mouth daily. Do not start before October 19, 2024. 30 capsule 0    dexmethylphenidate (FOCALIN XR) 10 MG 24 hr capsule Take 1 capsule (10 mg) by  "mouth daily 30 capsule 0    fluticasone (FLONASE) 50 MCG/ACT nasal spray Spray 1 spray into both nostrils daily 16 g 0    methylPREDNISolone (MEDROL DOSEPAK) 4 MG tablet therapy pack Take as directed per patient instruction card 21 tablet 0    rosuvastatin (CRESTOR) 20 MG tablet Take 1 tablet (20 mg) by mouth daily 90 tablet 3    sertraline (ZOLOFT) 100 MG tablet Take 1 tablet (100 mg) by mouth daily. 90 tablet 3    loratadine (CLARITIN) 10 MG tablet       omeprazole-sodium bicarbonate  MG CAPS per capsule          Allergies   Allergen Reactions    Codeine Nausea and Vomiting    No Known Drug Allergy     Seasonal Allergies         Social History     Tobacco Use    Smoking status: Never    Smokeless tobacco: Never   Substance Use Topics    Alcohol use: Yes     Comment: few times per week      Family History   Problem Relation Age of Onset    Breast Cancer Mother         age 48-     Breast Cancer Maternal Grandmother         age 50's-     Anxiety Disorder Other      History   Drug Use No             Review of Systems  Constitutional, HEENT, cardiovascular, pulmonary, GI, , musculoskeletal, neuro, skin, endocrine and psych systems are negative, except as otherwise noted.    Objective    /83   Pulse 68   Temp 97.6  F (36.4  C) (Temporal)   Resp 14   Ht 1.669 m (5' 5.7\")   Wt 74.8 kg (165 lb)   LMP 2006   SpO2 98%   BMI 26.88 kg/m     Estimated body mass index is 26.88 kg/m  as calculated from the following:    Height as of this encounter: 1.669 m (5' 5.7\").    Weight as of this encounter: 74.8 kg (165 lb).  Physical Exam  GENERAL: alert and no distress  EYES: Eyes grossly normal to inspection, PERRL and conjunctivae and sclerae normal  HENT: ear canals and TM's normal, nose and mouth without ulcers or lesions  NECK: 1 cm firm, mobile mass right submandibular gland. No cervical adenopathy.   RESP: lungs clear to auscultation - no rales, rhonchi or wheezes  CV: regular rate and rhythm, " normal S1 S2, no S3 or S4, no murmur, click or rub, no peripheral edema  ABDOMEN: soft, nontender, no hepatosplenomegaly, no masses and bowel sounds normal  MS: no gross musculoskeletal defects noted, no edema  SKIN: no suspicious lesions or rashes  NEURO: Normal strength and tone, mentation intact and speech normal  PSYCH: mentation appears normal, affect normal/bright    Recent Labs   Lab Test 08/21/24  1004   HGB 14.1           Diagnostics  Recent Results (from the past 24 hours)   Hemoglobin    Collection Time: 11/11/24  9:19 AM   Result Value Ref Range    Hemoglobin 13.5 11.7 - 15.7 g/dL   Comprehensive metabolic panel (BMP + Alb, Alk Phos, ALT, AST, Total. Bili, TP)    Collection Time: 11/11/24  9:19 AM   Result Value Ref Range    Sodium 140 135 - 145 mmol/L    Potassium 4.3 3.4 - 5.3 mmol/L    Carbon Dioxide (CO2) 26 22 - 29 mmol/L    Anion Gap 10 7 - 15 mmol/L    Urea Nitrogen 13.5 6.0 - 20.0 mg/dL    Creatinine 0.85 0.51 - 0.95 mg/dL    GFR Estimate 79 >60 mL/min/1.73m2    Calcium 10.0 8.8 - 10.4 mg/dL    Chloride 104 98 - 107 mmol/L    Glucose 99 70 - 99 mg/dL    Alkaline Phosphatase 63 40 - 150 U/L    AST 24 0 - 45 U/L    ALT 34 0 - 50 U/L    Protein Total 6.9 6.4 - 8.3 g/dL    Albumin 4.5 3.5 - 5.2 g/dL    Bilirubin Total 0.5 <=1.2 mg/dL      No EKG required, no history of coronary heart disease, significant arrhythmia, peripheral arterial disease or other structural heart disease.    Revised Cardiac Risk Index (RCRI)  The patient has the following serious cardiovascular risks for perioperative complications:   - No serious cardiac risks = 0 points     RCRI Interpretation: 0 points: Class I (very low risk - 0.4% complication rate)         Signed Electronically by: Emma Carrasco PA-C on 11/11/2024 at 9:00 AM    A copy of this evaluation report is provided to the requesting physician.

## 2024-12-02 ENCOUNTER — ANESTHESIA EVENT (OUTPATIENT)
Dept: SURGERY | Facility: CLINIC | Age: 59
End: 2024-12-02
Payer: COMMERCIAL

## 2024-12-02 NOTE — OR NURSING
Pt advised to hold Marijuana, cannabis, CBD, THC in any form for 24 hrs before surgery. She verbalized understanding.

## 2024-12-03 ENCOUNTER — ANESTHESIA (OUTPATIENT)
Dept: SURGERY | Facility: CLINIC | Age: 59
End: 2024-12-03
Payer: COMMERCIAL

## 2024-12-03 ENCOUNTER — HOSPITAL ENCOUNTER (OUTPATIENT)
Facility: CLINIC | Age: 59
Discharge: HOME | End: 2024-12-03
Attending: STUDENT IN AN ORGANIZED HEALTH CARE EDUCATION/TRAINING PROGRAM | Admitting: STUDENT IN AN ORGANIZED HEALTH CARE EDUCATION/TRAINING PROGRAM
Payer: COMMERCIAL

## 2024-12-03 VITALS
WEIGHT: 164.46 LBS | HEIGHT: 66 IN | DIASTOLIC BLOOD PRESSURE: 87 MMHG | RESPIRATION RATE: 14 BRPM | BODY MASS INDEX: 26.43 KG/M2 | SYSTOLIC BLOOD PRESSURE: 143 MMHG | HEART RATE: 98 BPM | OXYGEN SATURATION: 94 % | TEMPERATURE: 98 F

## 2024-12-03 DIAGNOSIS — D11.9 PLEOMORPHIC ADENOMA OF SUBMANDIBULAR GLAND: Primary | ICD-10-CM

## 2024-12-03 PROCEDURE — 370N000017 HC ANESTHESIA TECHNICAL FEE, PER MIN: Performed by: STUDENT IN AN ORGANIZED HEALTH CARE EDUCATION/TRAINING PROGRAM

## 2024-12-03 PROCEDURE — 88305 TISSUE EXAM BY PATHOLOGIST: CPT | Mod: 26 | Performed by: STUDENT IN AN ORGANIZED HEALTH CARE EDUCATION/TRAINING PROGRAM

## 2024-12-03 PROCEDURE — 250N000011 HC RX IP 250 OP 636: Performed by: ANESTHESIOLOGY

## 2024-12-03 PROCEDURE — 272N000001 HC OR GENERAL SUPPLY STERILE: Performed by: STUDENT IN AN ORGANIZED HEALTH CARE EDUCATION/TRAINING PROGRAM

## 2024-12-03 PROCEDURE — 250N000011 HC RX IP 250 OP 636: Performed by: STUDENT IN AN ORGANIZED HEALTH CARE EDUCATION/TRAINING PROGRAM

## 2024-12-03 PROCEDURE — 710N000010 HC RECOVERY PHASE 1, LEVEL 2, PER MIN: Performed by: STUDENT IN AN ORGANIZED HEALTH CARE EDUCATION/TRAINING PROGRAM

## 2024-12-03 PROCEDURE — 88307 TISSUE EXAM BY PATHOLOGIST: CPT | Mod: 26 | Performed by: STUDENT IN AN ORGANIZED HEALTH CARE EDUCATION/TRAINING PROGRAM

## 2024-12-03 PROCEDURE — 999N000141 HC STATISTIC PRE-PROCEDURE NURSING ASSESSMENT: Performed by: STUDENT IN AN ORGANIZED HEALTH CARE EDUCATION/TRAINING PROGRAM

## 2024-12-03 PROCEDURE — 250N000013 HC RX MED GY IP 250 OP 250 PS 637: Performed by: ANESTHESIOLOGY

## 2024-12-03 PROCEDURE — 250N000013 HC RX MED GY IP 250 OP 250 PS 637: Performed by: STUDENT IN AN ORGANIZED HEALTH CARE EDUCATION/TRAINING PROGRAM

## 2024-12-03 PROCEDURE — 88307 TISSUE EXAM BY PATHOLOGIST: CPT | Mod: TC | Performed by: STUDENT IN AN ORGANIZED HEALTH CARE EDUCATION/TRAINING PROGRAM

## 2024-12-03 PROCEDURE — 42440 EXCISE SUBMAXILLARY GLAND: CPT | Mod: RT | Performed by: STUDENT IN AN ORGANIZED HEALTH CARE EDUCATION/TRAINING PROGRAM

## 2024-12-03 PROCEDURE — 360N000077 HC SURGERY LEVEL 4, PER MIN: Performed by: STUDENT IN AN ORGANIZED HEALTH CARE EDUCATION/TRAINING PROGRAM

## 2024-12-03 PROCEDURE — 250N000009 HC RX 250: Performed by: ANESTHESIOLOGY

## 2024-12-03 PROCEDURE — 258N000003 HC RX IP 258 OP 636: Performed by: ANESTHESIOLOGY

## 2024-12-03 PROCEDURE — 710N000012 HC RECOVERY PHASE 2, PER MINUTE: Performed by: STUDENT IN AN ORGANIZED HEALTH CARE EDUCATION/TRAINING PROGRAM

## 2024-12-03 PROCEDURE — 88305 TISSUE EXAM BY PATHOLOGIST: CPT | Mod: TC | Performed by: STUDENT IN AN ORGANIZED HEALTH CARE EDUCATION/TRAINING PROGRAM

## 2024-12-03 PROCEDURE — 250N000025 HC SEVOFLURANE, PER MIN: Performed by: STUDENT IN AN ORGANIZED HEALTH CARE EDUCATION/TRAINING PROGRAM

## 2024-12-03 RX ORDER — MAGNESIUM SULFATE HEPTAHYDRATE 40 MG/ML
2 INJECTION, SOLUTION INTRAVENOUS ONCE
Status: COMPLETED | OUTPATIENT
Start: 2024-12-03 | End: 2024-12-03

## 2024-12-03 RX ORDER — OXYCODONE HYDROCHLORIDE 5 MG/1
5-10 TABLET ORAL EVERY 4 HOURS PRN
Qty: 16 TABLET | Refills: 0 | Status: SHIPPED | OUTPATIENT
Start: 2024-12-03

## 2024-12-03 RX ORDER — DEXAMETHASONE SODIUM PHOSPHATE 4 MG/ML
INJECTION, SOLUTION INTRA-ARTICULAR; INTRALESIONAL; INTRAMUSCULAR; INTRAVENOUS; SOFT TISSUE PRN
Status: DISCONTINUED | OUTPATIENT
Start: 2024-12-03 | End: 2024-12-03

## 2024-12-03 RX ORDER — DEXAMETHASONE SODIUM PHOSPHATE 10 MG/ML
10 INJECTION, SOLUTION INTRAMUSCULAR; INTRAVENOUS ONCE
Status: DISCONTINUED | OUTPATIENT
Start: 2024-12-03 | End: 2024-12-03 | Stop reason: HOSPADM

## 2024-12-03 RX ORDER — ACETAMINOPHEN 325 MG/1
975 TABLET ORAL ONCE
Status: CANCELLED | OUTPATIENT
Start: 2024-12-03 | End: 2024-12-03

## 2024-12-03 RX ORDER — LIDOCAINE HYDROCHLORIDE AND EPINEPHRINE 10; 10 MG/ML; UG/ML
INJECTION, SOLUTION INFILTRATION; PERINEURAL PRN
Status: DISCONTINUED | OUTPATIENT
Start: 2024-12-03 | End: 2024-12-03 | Stop reason: HOSPADM

## 2024-12-03 RX ORDER — ONDANSETRON 2 MG/ML
4 INJECTION INTRAMUSCULAR; INTRAVENOUS EVERY 30 MIN PRN
Status: CANCELLED | OUTPATIENT
Start: 2024-12-03

## 2024-12-03 RX ORDER — OXYCODONE HYDROCHLORIDE 5 MG/1
5 TABLET ORAL
Status: CANCELLED | OUTPATIENT
Start: 2024-12-03

## 2024-12-03 RX ORDER — FENTANYL CITRATE 50 UG/ML
INJECTION, SOLUTION INTRAMUSCULAR; INTRAVENOUS PRN
Status: DISCONTINUED | OUTPATIENT
Start: 2024-12-03 | End: 2024-12-03

## 2024-12-03 RX ORDER — ONDANSETRON 2 MG/ML
INJECTION INTRAMUSCULAR; INTRAVENOUS PRN
Status: DISCONTINUED | OUTPATIENT
Start: 2024-12-03 | End: 2024-12-03

## 2024-12-03 RX ORDER — LABETALOL HYDROCHLORIDE 5 MG/ML
10 INJECTION, SOLUTION INTRAVENOUS
Status: DISCONTINUED | OUTPATIENT
Start: 2024-12-03 | End: 2024-12-03 | Stop reason: HOSPADM

## 2024-12-03 RX ORDER — ONDANSETRON 2 MG/ML
4 INJECTION INTRAMUSCULAR; INTRAVENOUS EVERY 30 MIN PRN
Status: DISCONTINUED | OUTPATIENT
Start: 2024-12-03 | End: 2024-12-03 | Stop reason: HOSPADM

## 2024-12-03 RX ORDER — HYDROMORPHONE HCL IN WATER/PF 6 MG/30 ML
0.4 PATIENT CONTROLLED ANALGESIA SYRINGE INTRAVENOUS EVERY 5 MIN PRN
Status: DISCONTINUED | OUTPATIENT
Start: 2024-12-03 | End: 2024-12-03 | Stop reason: HOSPADM

## 2024-12-03 RX ORDER — ONDANSETRON 4 MG/1
4 TABLET, ORALLY DISINTEGRATING ORAL EVERY 30 MIN PRN
Status: DISCONTINUED | OUTPATIENT
Start: 2024-12-03 | End: 2024-12-03 | Stop reason: HOSPADM

## 2024-12-03 RX ORDER — LIDOCAINE HYDROCHLORIDE 20 MG/ML
INJECTION, SOLUTION INFILTRATION; PERINEURAL PRN
Status: DISCONTINUED | OUTPATIENT
Start: 2024-12-03 | End: 2024-12-03

## 2024-12-03 RX ORDER — NALOXONE HYDROCHLORIDE 0.4 MG/ML
0.1 INJECTION, SOLUTION INTRAMUSCULAR; INTRAVENOUS; SUBCUTANEOUS
Status: DISCONTINUED | OUTPATIENT
Start: 2024-12-03 | End: 2024-12-03 | Stop reason: HOSPADM

## 2024-12-03 RX ORDER — PROPOFOL 10 MG/ML
INJECTION, EMULSION INTRAVENOUS PRN
Status: DISCONTINUED | OUTPATIENT
Start: 2024-12-03 | End: 2024-12-03

## 2024-12-03 RX ORDER — ONDANSETRON 4 MG/1
4 TABLET, ORALLY DISINTEGRATING ORAL EVERY 30 MIN PRN
Status: CANCELLED | OUTPATIENT
Start: 2024-12-03

## 2024-12-03 RX ORDER — HYDROMORPHONE HCL IN WATER/PF 6 MG/30 ML
0.2 PATIENT CONTROLLED ANALGESIA SYRINGE INTRAVENOUS EVERY 5 MIN PRN
Status: DISCONTINUED | OUTPATIENT
Start: 2024-12-03 | End: 2024-12-03 | Stop reason: HOSPADM

## 2024-12-03 RX ORDER — HYDRALAZINE HYDROCHLORIDE 20 MG/ML
2.5-5 INJECTION INTRAMUSCULAR; INTRAVENOUS EVERY 10 MIN PRN
Status: DISCONTINUED | OUTPATIENT
Start: 2024-12-03 | End: 2024-12-03 | Stop reason: HOSPADM

## 2024-12-03 RX ORDER — HALOPERIDOL 5 MG/ML
1 INJECTION INTRAMUSCULAR
Status: DISCONTINUED | OUTPATIENT
Start: 2024-12-03 | End: 2024-12-03 | Stop reason: HOSPADM

## 2024-12-03 RX ORDER — ONDANSETRON 4 MG/1
4 TABLET, ORALLY DISINTEGRATING ORAL
Status: DISCONTINUED | OUTPATIENT
Start: 2024-12-03 | End: 2024-12-03 | Stop reason: HOSPADM

## 2024-12-03 RX ORDER — DEXAMETHASONE SODIUM PHOSPHATE 4 MG/ML
4 INJECTION, SOLUTION INTRA-ARTICULAR; INTRALESIONAL; INTRAMUSCULAR; INTRAVENOUS; SOFT TISSUE
Status: CANCELLED | OUTPATIENT
Start: 2024-12-03

## 2024-12-03 RX ORDER — ACETAMINOPHEN 325 MG/1
650 TABLET ORAL
Status: DISCONTINUED | OUTPATIENT
Start: 2024-12-03 | End: 2024-12-03 | Stop reason: HOSPADM

## 2024-12-03 RX ORDER — SODIUM CHLORIDE, SODIUM LACTATE, POTASSIUM CHLORIDE, CALCIUM CHLORIDE 600; 310; 30; 20 MG/100ML; MG/100ML; MG/100ML; MG/100ML
INJECTION, SOLUTION INTRAVENOUS CONTINUOUS PRN
Status: DISCONTINUED | OUTPATIENT
Start: 2024-12-03 | End: 2024-12-03

## 2024-12-03 RX ORDER — NALOXONE HYDROCHLORIDE 0.4 MG/ML
0.1 INJECTION, SOLUTION INTRAMUSCULAR; INTRAVENOUS; SUBCUTANEOUS
Status: CANCELLED | OUTPATIENT
Start: 2024-12-03

## 2024-12-03 RX ORDER — ACETAMINOPHEN 325 MG/1
975 TABLET ORAL ONCE
Status: COMPLETED | OUTPATIENT
Start: 2024-12-03 | End: 2024-12-03

## 2024-12-03 RX ORDER — DEXAMETHASONE SODIUM PHOSPHATE 4 MG/ML
4 INJECTION, SOLUTION INTRA-ARTICULAR; INTRALESIONAL; INTRAMUSCULAR; INTRAVENOUS; SOFT TISSUE
Status: DISCONTINUED | OUTPATIENT
Start: 2024-12-03 | End: 2024-12-03 | Stop reason: HOSPADM

## 2024-12-03 RX ORDER — OXYCODONE HYDROCHLORIDE 5 MG/1
5 TABLET ORAL
Status: COMPLETED | OUTPATIENT
Start: 2024-12-03 | End: 2024-12-03

## 2024-12-03 RX ADMIN — DEXAMETHASONE SODIUM PHOSPHATE 10 MG: 4 INJECTION, SOLUTION INTRA-ARTICULAR; INTRALESIONAL; INTRAMUSCULAR; INTRAVENOUS; SOFT TISSUE at 08:24

## 2024-12-03 RX ADMIN — FENTANYL CITRATE 100 MCG: 50 INJECTION INTRAMUSCULAR; INTRAVENOUS at 08:24

## 2024-12-03 RX ADMIN — MAGNESIUM SULFATE HEPTAHYDRATE 2 G: 2 INJECTION, SOLUTION INTRAVENOUS at 11:19

## 2024-12-03 RX ADMIN — OXYCODONE HYDROCHLORIDE 5 MG: 5 TABLET ORAL at 11:55

## 2024-12-03 RX ADMIN — SODIUM CHLORIDE, POTASSIUM CHLORIDE, SODIUM LACTATE AND CALCIUM CHLORIDE: 600; 310; 30; 20 INJECTION, SOLUTION INTRAVENOUS at 08:18

## 2024-12-03 RX ADMIN — PROPOFOL 50 MG: 10 INJECTION, EMULSION INTRAVENOUS at 08:35

## 2024-12-03 RX ADMIN — HYDROMORPHONE HYDROCHLORIDE 0.2 MG: 0.2 INJECTION, SOLUTION INTRAMUSCULAR; INTRAVENOUS; SUBCUTANEOUS at 11:34

## 2024-12-03 RX ADMIN — MIDAZOLAM 2 MG: 1 INJECTION INTRAMUSCULAR; INTRAVENOUS at 08:14

## 2024-12-03 RX ADMIN — FENTANYL CITRATE 50 MCG: 50 INJECTION INTRAMUSCULAR; INTRAVENOUS at 09:25

## 2024-12-03 RX ADMIN — PROPOFOL 50 MG: 10 INJECTION, EMULSION INTRAVENOUS at 08:33

## 2024-12-03 RX ADMIN — PROPOFOL 150 MG: 10 INJECTION, EMULSION INTRAVENOUS at 08:24

## 2024-12-03 RX ADMIN — ACETAMINOPHEN 650 MG: 325 TABLET, FILM COATED ORAL at 11:55

## 2024-12-03 RX ADMIN — ACETAMINOPHEN 975 MG: 325 TABLET, FILM COATED ORAL at 07:23

## 2024-12-03 RX ADMIN — LIDOCAINE HYDROCHLORIDE 80 MG: 20 INJECTION, SOLUTION INFILTRATION; PERINEURAL at 08:24

## 2024-12-03 RX ADMIN — HYDROMORPHONE HYDROCHLORIDE 0.2 MG: 0.2 INJECTION, SOLUTION INTRAMUSCULAR; INTRAVENOUS; SUBCUTANEOUS at 11:19

## 2024-12-03 RX ADMIN — FENTANYL CITRATE 50 MCG: 50 INJECTION INTRAMUSCULAR; INTRAVENOUS at 09:36

## 2024-12-03 RX ADMIN — ONDANSETRON 4 MG: 2 INJECTION INTRAMUSCULAR; INTRAVENOUS at 09:56

## 2024-12-03 RX ADMIN — SUCCINYLCHOLINE CHLORIDE 80 MG: 20 INJECTION, SOLUTION INTRAMUSCULAR; INTRAVENOUS; PARENTERAL at 08:24

## 2024-12-03 ASSESSMENT — ACTIVITIES OF DAILY LIVING (ADL)
ADLS_ACUITY_SCORE: 35
ADLS_ACUITY_SCORE: 35
ADLS_ACUITY_SCORE: 36
ADLS_ACUITY_SCORE: 35
ADLS_ACUITY_SCORE: 35
ADLS_ACUITY_SCORE: 36

## 2024-12-03 ASSESSMENT — VISUAL ACUITY: OU: BASELINE;BLURRED VISION

## 2024-12-03 ASSESSMENT — ENCOUNTER SYMPTOMS: SEIZURES: 0

## 2024-12-03 NOTE — OP NOTE
"Anesthesia Transfer of Care Note    Patient: Nydia Stevens    Procedure(s) Performed: Procedure(s) (LRB):  COLONOSCOPY (N/A)    Patient location: GI    Anesthesia Type: general    Transport from OR: Transported from OR on room air with adequate spontaneous ventilation    Post pain: adequate analgesia    Post assessment: no apparent anesthetic complications and tolerated procedure well    Post vital signs: stable    Level of consciousness: awake and alert    Nausea/Vomiting: no nausea/vomiting    Complications: none    Transfer of care protocol was followed      Last vitals:   Visit Vitals  BP (!) 91/53 (BP Location: Left arm, Patient Position: Lying)   Pulse 78   Temp 36.4 °C (97.5 °F) (Temporal)   Resp 14   Ht 5' 7" (1.702 m)   Wt 50.3 kg (111 lb)   SpO2 99%   Breastfeeding No   BMI 17.39 kg/m²     " Head and Neck Surgery  December 3, 2024      Surgeon: Augustin Garcia MD     Assistant: Jai Nieto MD     Preoperative Diagnosis:   1) right submandibular pleomorphic adenoma     Postoperative Diagnosis:  Same     Procedure:  1) Excision right submandibular gland     Anesthesia:  General     Estimated blood loss:  25 ml     Specimens:  Right submandibular gland     Complications:  None     Operative indications:  59 year old woman who felt a right neck mass. Workup consistent with a pleomorphic adenoma of right submandibular gland. Given risk of malignant transformation, surgery was recommended.      Operative findings:  Excision right submandibular gland with platysma overlying tumor. Marginal branch preserved. No adenopathy     Operative course:  Patient was brought to the operating room and placed on the operating table supine. General endotracheal anesthesia was induced. Incision was marked in a natural skin crease two fingerbreadths below mandible. 1% lidocaine with 1:100,000 epinephrine was used for local anesthesia. Timeout was performed to identify the patient and correctness of the procedure.  15 blade used to make incision through skin, subcutaneous tissue. Supraplatysmal flap elevated over tumor. Platysma then incised leaving platysma over tumor. This was sent for permanent pathology. Standard subplatysmal flaps were elevated superiorly to mandible and inferiorly. Marginal branch of facial nerve was identified. Fascia inferior to this released allowing nerve to retract superiorly. Dissection continued down to inferior border of mandible. Facial artery and vein ligated. Anterior belly digastric identified and lvl 1b contents released from digastric and mylohyoid. Mylohyoid retracted anteriorly to expose lingual nerve and hypoglossal nerve. Submandibular ganglion and duct divided. Level 1b contents released from posterior belly of digastric. Facial artery ligated posteriorly. Specimen sent for  permanent pathology.     The wound was irrigated and hemostasis obtained. Platysma closed with 3-0 vicryl, and skin with 4-0 nylon. Neck closed over 15 rd liza drain.      Patient tolerated the procedure well. She was subsequently turned over to anesthesia where she was awakened, extubated, and transferred to the recovery room in stable condition.      Augustin Garcia MD

## 2024-12-03 NOTE — DISCHARGE INSTRUCTIONS
Contacting your Doctor -   To contact a doctor, call Dr Garcia's clinic at [phone]  or:  [phone] and ask for the resident on call for ENT-Otolaryngology (answered 24 hours a day)   Emergency Department:  [facility]: [phone]  [facility]: [phone] 911 if you are in need of immediate or emergent help

## 2024-12-03 NOTE — ANESTHESIA CARE TRANSFER NOTE
Patient: Shanique Delgado    Procedure: Procedure(s):  Right Submandibular Gland Resection       Diagnosis: Pleomorphic adenoma of submandibular gland [D11.9]  Diagnosis Additional Information: No value filed.    Anesthesia Type:   General     Note:    Oropharynx: oropharynx clear of all foreign objects and spontaneously breathing  Level of Consciousness: awake  Oxygen Supplementation: nasal cannula  Level of Supplemental Oxygen (L/min / FiO2): 4  Independent Airway: airway patency satisfactory and stable  Dentition: dentition unchanged  Vital Signs Stable: post-procedure vital signs reviewed and stable  Report to RN Given: handoff report given  Patient transferred to: PACU    Handoff Report: Identifed the Patient, Identified the Reponsible Provider, Reviewed the pertinent medical history, Discussed the surgical course, Reviewed Intra-OP anesthesia mangement and issues during anesthesia, Set expectations for post-procedure period and Allowed opportunity for questions and acknowledgement of understanding      Vitals:  Vitals Value Taken Time   /93    Temp     Pulse 104    Resp 14    SpO2 98        Electronically Signed By: KORIN Bowden CRNA  December 3, 2024  10:23 AM

## 2024-12-03 NOTE — OR NURSING
paged regarding patient complaint of right ear pressure with no relief s/p pain medication administration.

## 2024-12-03 NOTE — OR NURSING
recommended tylenol, oxycodone administration for pain.  has no immediate concerns at this time and OK to continue to phase 2 when appropriate

## 2024-12-03 NOTE — ANESTHESIA POSTPROCEDURE EVALUATION
Patient: Shanique Delgado    Procedure: Procedure(s):  Right Submandibular Gland Resection       Anesthesia Type:  General    Note:  Disposition: Outpatient   Postop Pain Control: Uneventful            Sign Out: Well controlled pain   PONV: No   Neuro/Psych: Uneventful            Sign Out: Acceptable/Baseline neuro status   Airway/Respiratory: Uneventful            Sign Out: Acceptable/Baseline resp. status   CV/Hemodynamics: Uneventful            Sign Out: Acceptable CV status; No obvious hypovolemia; No obvious fluid overload   Other NRE: NONE   DID A NON-ROUTINE EVENT OCCUR? No           Last vitals:  Vitals Value Taken Time   /98 12/03/24 1200   Temp 37.1  C (98.8  F) 12/03/24 1025   Pulse 100 12/03/24 1203   Resp 15 12/03/24 1203   SpO2 90 % 12/03/24 1203   Vitals shown include unfiled device data.    Electronically Signed By: Daniel Oneil MD  December 3, 2024  12:04 PM

## 2024-12-03 NOTE — ANESTHESIA PROCEDURE NOTES
Airway       Patient location during procedure: OR       Procedure Start/Stop Times: 12/3/2024 8:27 AM  Staff -        Anesthesiologist:  Cathy Irby MD       CRNA: Yen Trevizo APRN CRNA       Other Anesthesia Staff: Dahlia Soto       Performed By: JOSE and with CRNAs       Procedure performed by resident/fellow/CRNA in presence of a teaching physician.    Consent for Airway        Urgency: elective  Indications and Patient Condition       Indications for airway management: yamileth-procedural       Induction type:RSI       Mask difficulty assessment: 1 - vent by mask    Final Airway Details       Final airway type: endotracheal airway       Successful airway: ETT - single  Endotracheal Airway Details        ETT size (mm): 7.0       Cuffed: yes       Successful intubation technique: direct laryngoscopy       DL Blade Type: Santana 2       Grade View of Cords: 2       Adjucts: stylet       Position: Left       Measured from: gums/teeth       Secured at (cm): 21       Bite block used: None    Post intubation assessment        Placement verified by: capnometry, equal breath sounds and chest rise        Number of attempts at approach: 1       Number of other approaches attempted: 0       Secured with: commercial tube rousseau and tape       Ease of procedure: easy       Dentition: Intact and Unchanged    Medication(s) Administered   Medication Administration Time: 12/3/2024 8:27 AM

## 2024-12-05 ENCOUNTER — PATIENT OUTREACH (OUTPATIENT)
Dept: OTOLARYNGOLOGY | Facility: CLINIC | Age: 59
End: 2024-12-05
Payer: COMMERCIAL

## 2024-12-05 NOTE — PROGRESS NOTES
Called and left voicemail for patient regarding SAMUEL drain from surgery with Dr. Garcia on 12/3/24. Direct callback number left in voicemail.     Lexie Guillermo, RN, BSN  RN Care Coordinator, ENT Clinic

## 2024-12-06 LAB
PATH REPORT.COMMENTS IMP SPEC: NORMAL
PATH REPORT.COMMENTS IMP SPEC: NORMAL
PATH REPORT.FINAL DX SPEC: NORMAL
PATH REPORT.GROSS SPEC: NORMAL
PATH REPORT.MICROSCOPIC SPEC OTHER STN: NORMAL
PATH REPORT.RELEVANT HX SPEC: NORMAL
PHOTO IMAGE: NORMAL

## 2024-12-07 DIAGNOSIS — F32.1 MODERATE SINGLE CURRENT EPISODE OF MAJOR DEPRESSIVE DISORDER (H): ICD-10-CM

## 2024-12-09 ENCOUNTER — OFFICE VISIT (OUTPATIENT)
Dept: OTOLARYNGOLOGY | Facility: CLINIC | Age: 59
End: 2024-12-09
Payer: COMMERCIAL

## 2024-12-09 VITALS
WEIGHT: 160 LBS | BODY MASS INDEX: 25.82 KG/M2 | HEART RATE: 82 BPM | DIASTOLIC BLOOD PRESSURE: 89 MMHG | SYSTOLIC BLOOD PRESSURE: 138 MMHG | OXYGEN SATURATION: 96 %

## 2024-12-09 DIAGNOSIS — D11.9 PLEOMORPHIC ADENOMA OF SUBMANDIBULAR GLAND: Primary | ICD-10-CM

## 2024-12-09 PROCEDURE — 99024 POSTOP FOLLOW-UP VISIT: CPT | Performed by: STUDENT IN AN ORGANIZED HEALTH CARE EDUCATION/TRAINING PROGRAM

## 2024-12-09 RX ORDER — BUPROPION HYDROCHLORIDE 300 MG/1
TABLET ORAL
Qty: 90 TABLET | Refills: 1 | Status: SHIPPED | OUTPATIENT
Start: 2024-12-09

## 2024-12-09 ASSESSMENT — PAIN SCALES - GENERAL: PAINLEVEL_OUTOF10: MODERATE PAIN (5)

## 2024-12-09 NOTE — NURSING NOTE
Chief Complaint   Patient presents with    RECHECK     Post op     .Blood pressure 138/89, pulse 82, weight 72.6 kg (160 lb), last menstrual period 07/31/2006, SpO2 96%, not currently breastfeeding.    Flaco Royal LPN

## 2024-12-09 NOTE — LETTER
12/9/2024       RE: Shanique Delgado  [address] [address]     Dear Colleague,    Thank you for referring your patient, Shanique Delgado, to the [facility] [place] at . Please see a copy of my visit note below.    Head and Neck Surgery  12/9/2024    Diagnosis: Pleomorphic adenoma right submandibular gland    Treatment: Excision right submandibular gland    Pathology: 0.7 cm pleomorphic adenoma, completely excised    Interval history: Overall doing well. Continues to have right ear pain    Physical Examination:  Alert, NAD  Facial nerve function normal  No adenopathy  Normal right ear exam, normal ear canal  Sutures and drain removed    A/P:  Doing well post op  Discussed path  Discussed wound care  Follow up as needed    Augustin Garcia MD      25 minutes spent on the date of the encounter in chart review, patient visit, review of tests, documentation and/or discussion with other providers about the issues documented above.       Again, thank you for allowing me to participate in the care of your patient.      Sincerely,    Augustin Garcia MD

## 2024-12-09 NOTE — PATIENT INSTRUCTIONS
You were seen in the ENT Clinic today by Dr. Garcia. If you have any questions or concerns after your appointment, please contact us (see below)    Please return to clinic as needed    How to Contact Us:  Send a [employer] message to your provider. Our team will respond to you via [employer]. Occasionally, we will need to call you to get further information.  For urgent matters (Monday-Friday), call the ENT Clinic: [phone] and speak with a call center team member - they will route your call appropriately.   If you'd like to speak directly with a nurse, please find our contact information below. We do our best to check voicemail frequently throughout the day, and will work to call you back within 1-2 days. For urgent matters, please use the general clinic phone numbers listed above.    Lexie CARRANZA RN, BSN  RN Care Coordinator, ENT Clinic  [facility]  Direct: [phone]

## 2024-12-09 NOTE — PROGRESS NOTES
Head and Neck Surgery  12/9/2024    Diagnosis: Pleomorphic adenoma right submandibular gland    Treatment: Excision right submandibular gland    Pathology: 0.7 cm pleomorphic adenoma, completely excised    Interval history: Overall doing well. Continues to have right ear pain    Physical Examination:  Alert, NAD  Facial nerve function normal  No adenopathy  Normal right ear exam, normal ear canal  Sutures and drain removed    A/P:  Doing well post op  Discussed path  Discussed wound care  Follow up as needed    Augustin Garcia MD      25 minutes spent on the date of the encounter in chart review, patient visit, review of tests, documentation and/or discussion with other providers about the issues documented above.

## 2025-01-30 ENCOUNTER — TELEPHONE (OUTPATIENT)
Dept: GASTROENTEROLOGY | Facility: CLINIC | Age: 60
End: 2025-01-30
Payer: COMMERCIAL

## 2025-01-30 NOTE — TELEPHONE ENCOUNTER
"Endoscopy Scheduling Screen    Have you had any respiratory illness or flu-like symptoms in the last 10 days?  No    What is your communication preference for Instructions and/or Bowel Prep?   MyChart    What insurance is in the chart?  Other:  [employer]    Ordering/Referring Provider: Emma Carrasco PA-C in [facility] FP/IM/PEDS   (If ordering provider performs procedure, schedule with ordering provider unless otherwise instructed. )    BMI: Estimated body mass index is 25.8 kg/m  as calculated from the following:    Height as of 1/17/25: 1.686 m (5' 6.38\").    Weight as of 1/17/25: 73.3 kg (161 lb 11.2 oz).     Sedation Ordered  moderate sedation.   If patient BMI > 50 do not schedule in [facility].    If patient BMI > 45 do not schedule at [facility].    Are you taking methadone or Suboxone?  NO, No RN review required.    Have you been diagnosed and are being treated for severe PTSD or severe anxiety?  NO, No RN review required.    Are you taking any prescription medications for pain 3 or more times per week?   NO, No RN review required.    Do you have a history of malignant hyperthermia?  No    (Females) Are you currently pregnant?   No     Have you been diagnosed or told you have pulmonary hypertension?   No    Do you have an LVAD?  No    Have you been told you have moderate to severe sleep apnea?  No.    Have you been told you have COPD, asthma, or any other lung disease?  No    Do you have any heart conditions?  No     Have you ever had or are you waiting for an organ transplant?  No. Continue scheduling, no site restrictions.    Have you had a stroke or transient ischemic attack (TIA aka \"mini stroke\" in the last 6 months?   No    Have you been diagnosed with or been told you have cirrhosis of the liver?   No.    Are you currently on dialysis?   No    Do you need assistance transferring?   No    BMI: Estimated body mass index is 25.8 kg/m  as calculated from the following:    Height as of 1/17/25: 1.686 m (5' 6.38\").    Weight " as of 1/17/25: 73.3 kg (161 lb 11.2 oz).     Is patients BMI > 40 and scheduling location [facility]?  No    Do you take an injectable or oral medication for weight loss or diabetes (excluding insulin)?  No    Do you take the medication Naltrexone?  No    Do you take blood thinners?  No       Prep   Are you currently on dialysis or do you have chronic kidney disease?  No    Do you have a diagnosis of diabetes?  No    Do you have a diagnosis of cystic fibrosis (CF)?  No    On a regular basis do you go 3 -5 days between bowel movements?  No    BMI > 40?  No    Preferred Pharmacy:    [id number] - [place], [place] - [address]  [address]  [place] [address]  Phone: [phone] Fax: [phone]    Final Scheduling Details     Procedure scheduled  Colonoscopy    Surgeon:  Isha     Date of procedure:  3.3.25     Pre-OP / PAC:   No - Not required for this site.    Location  [facility] - Patient preference.    Sedation   Moderate Sedation - Per order.      Patient Reminders:   You will receive a call from a Nurse to review instructions and health history.  This assessment must be completed prior to your procedure.  Failure to complete the Nurse assessment may result in the procedure being cancelled.      On the day of your procedure, please designate an adult(s) who can drive you home stay with you for the next 24 hours. The medicines used in the exam will make you sleepy. You will not be able to drive.      You cannot take public transportation, ride share services, or non-medical taxi service without a responsible caregiver.  Medical transport services are allowed with the requirement that a responsible caregiver will receive you at your destination.  We require that drivers and caregivers are confirmed prior to your procedure.

## 2025-02-14 ENCOUNTER — TELEPHONE (OUTPATIENT)
Dept: GASTROENTEROLOGY | Facility: CLINIC | Age: 60
End: 2025-02-14
Payer: COMMERCIAL

## 2025-02-14 NOTE — TELEPHONE ENCOUNTER
Pre visit planning completed.      Procedure details:    Patient scheduled for Colonoscopy on 3/3/25.     Arrival time: 1345. Procedure time 1430    Facility [facility]: [facility]; [facility] [place] [address]. Check in location: [facility].     Sedation type: Conscious sedation - discuss Oxycodone use (was prescribed after a procedure in 12/2024, but still active on med list)    Pre op exam needed? No.    Indication for procedure: screening      Chart review:     Electronic implanted devices? No    Recent diagnosis of diverticulitis within the last 6 weeks? No      Medication review:    Diabetic? No    Anticoagulants? No    Weight loss medication/injectable? No GLP-1 medication per patient's medication list. Nursing to verify with pre-assessment call.    Other medication HOLDING recommendations:  Cannabis/Marijuana: Stop night before procedure.      Prep for procedure:     Bowel prep recommendation: Standard Miralax.   Due to: standard bowel prep    Procedure information and instructions sent via [employer]         Yanni Sterling RN  Endoscopy Procedure Pre Assessment   [phone] option 3

## 2025-02-17 NOTE — TELEPHONE ENCOUNTER
Attempted to contact patient in order to complete pre assessment questions.     No answer. Left message to return call to [phone] option 3.    Callback communication sent via [employer].    Yanni Sterling RN

## 2025-02-19 NOTE — TELEPHONE ENCOUNTER
Pre assessment completed for upcoming procedure.   (Please see previous telephone encounter notes for complete details)    Patient returned call.       Procedure details:    Arrival time and facility location reviewed.    Pre op exam needed? No.    Designated  policy reviewed. Instructed to have someone stay 6  hours post procedure.       Medication review:    Medications reviewed. Please see supporting documentation below. Holding recommendations discussed (if applicable).   Cannabis/Marijuana : STOP night before procedure.  Patient not taking oxycodone.    Prep for procedure:     Procedure prep instructions reviewed.        Any additional information needed:  N/A      Patient verbalized understanding and had no questions or concerns at this time.      Marisol Hardin LPN  Endoscopy Procedure Pre Assessment   [phone] option 3

## 2025-03-03 ENCOUNTER — HOSPITAL ENCOUNTER (OUTPATIENT)
Facility: CLINIC | Age: 60
Discharge: HOME | End: 2025-03-03
Attending: COLON & RECTAL SURGERY | Admitting: COLON & RECTAL SURGERY
Payer: COMMERCIAL

## 2025-03-03 VITALS
OXYGEN SATURATION: 95 % | HEART RATE: 87 BPM | SYSTOLIC BLOOD PRESSURE: 133 MMHG | WEIGHT: 155 LBS | HEIGHT: 66 IN | DIASTOLIC BLOOD PRESSURE: 91 MMHG | BODY MASS INDEX: 24.91 KG/M2 | RESPIRATION RATE: 11 BRPM

## 2025-03-03 LAB — COLONOSCOPY: NORMAL

## 2025-03-03 PROCEDURE — G0500 MOD SEDAT ENDO SERVICE >5YRS: HCPCS | Performed by: COLON & RECTAL SURGERY

## 2025-03-03 PROCEDURE — 45378 DIAGNOSTIC COLONOSCOPY: CPT | Performed by: COLON & RECTAL SURGERY

## 2025-03-03 PROCEDURE — 250N000011 HC RX IP 250 OP 636: Performed by: COLON & RECTAL SURGERY

## 2025-03-03 PROCEDURE — G0121 COLON CA SCRN NOT HI RSK IND: HCPCS | Performed by: COLON & RECTAL SURGERY

## 2025-03-03 PROCEDURE — 99153 MOD SED SAME PHYS/QHP EA: CPT | Performed by: COLON & RECTAL SURGERY

## 2025-03-03 RX ORDER — NALOXONE HYDROCHLORIDE 0.4 MG/ML
0.2 INJECTION, SOLUTION INTRAMUSCULAR; INTRAVENOUS; SUBCUTANEOUS
Status: DISCONTINUED | OUTPATIENT
Start: 2025-03-03 | End: 2025-03-03 | Stop reason: HOSPADM

## 2025-03-03 RX ORDER — NALOXONE HYDROCHLORIDE 0.4 MG/ML
0.4 INJECTION, SOLUTION INTRAMUSCULAR; INTRAVENOUS; SUBCUTANEOUS
Status: DISCONTINUED | OUTPATIENT
Start: 2025-03-03 | End: 2025-03-03 | Stop reason: HOSPADM

## 2025-03-03 RX ORDER — LIDOCAINE 40 MG/G
CREAM TOPICAL
Status: DISCONTINUED | OUTPATIENT
Start: 2025-03-03 | End: 2025-03-03 | Stop reason: HOSPADM

## 2025-03-03 RX ORDER — FLUMAZENIL 0.1 MG/ML
0.2 INJECTION, SOLUTION INTRAVENOUS
Status: DISCONTINUED | OUTPATIENT
Start: 2025-03-03 | End: 2025-03-03 | Stop reason: HOSPADM

## 2025-03-03 RX ORDER — ONDANSETRON 4 MG/1
4 TABLET, ORALLY DISINTEGRATING ORAL EVERY 6 HOURS PRN
Status: DISCONTINUED | OUTPATIENT
Start: 2025-03-03 | End: 2025-03-03 | Stop reason: HOSPADM

## 2025-03-03 RX ORDER — MULTIVITAMIN WITH IRON
1 TABLET ORAL DAILY
COMMUNITY

## 2025-03-03 RX ORDER — PROCHLORPERAZINE MALEATE 10 MG
10 TABLET ORAL EVERY 6 HOURS PRN
Status: DISCONTINUED | OUTPATIENT
Start: 2025-03-03 | End: 2025-03-03 | Stop reason: HOSPADM

## 2025-03-03 RX ORDER — FENTANYL CITRATE 50 UG/ML
INJECTION, SOLUTION INTRAMUSCULAR; INTRAVENOUS PRN
Status: DISCONTINUED | OUTPATIENT
Start: 2025-03-03 | End: 2025-03-03 | Stop reason: HOSPADM

## 2025-03-03 RX ORDER — ONDANSETRON 2 MG/ML
4 INJECTION INTRAMUSCULAR; INTRAVENOUS EVERY 6 HOURS PRN
Status: DISCONTINUED | OUTPATIENT
Start: 2025-03-03 | End: 2025-03-03 | Stop reason: HOSPADM

## 2025-03-03 RX ORDER — ONDANSETRON 2 MG/ML
4 INJECTION INTRAMUSCULAR; INTRAVENOUS
Status: DISCONTINUED | OUTPATIENT
Start: 2025-03-03 | End: 2025-03-03 | Stop reason: HOSPADM

## 2025-03-03 RX ORDER — OMEPRAZOLE 20 MG/1
20 CAPSULE, DELAYED RELEASE ORAL DAILY
COMMUNITY

## 2025-03-03 ASSESSMENT — ACTIVITIES OF DAILY LIVING (ADL)
ADLS_ACUITY_SCORE: 42
ADLS_ACUITY_SCORE: 42

## 2025-03-03 NOTE — H&P
Pre-Endoscopy History and Physical   Shanique Delgado MRN# [id number]   YOB: 1965 Age: 59 year old   Date of Procedure: 3/3/2025   Primary care provider: Emma Carrasco   Type of Endoscopy: colonoscopy   Reason for Procedure: screening   Type of Anesthesia Anticipated: Moderate Sedation   HPI:   Shanique is a 59 year old female for screening colonoscopy.  She last had a colonoscopy in 2013 that was normal.  She denies BRBPR, abdominal pain, nausea/vomiting, changes in bowel habits or unintentional weight loss.  She denies a FH of CRC.    Allergies   Allergen Reactions    Codeine Nausea and Vomiting    Seasonal Allergies       Prior to Admission Medications   Prescriptions Last Dose Informant Patient Reported? Taking?   buPROPion (WELLBUTRIN XL) 300 MG 24 hr tablet 3/2/2025 Morning  No Yes   Sig: TAKE 1 TABLET BY MOUTH EVERY DAY IN THE MORNING   dexmethylphenidate (FOCALIN XR) 5 MG 24 hr capsule Past Month  No Yes   Sig: Take 1 capsule (5 mg) by mouth daily.   magnesium 250 MG tablet 3/2/2025 Morning  Yes Yes   Sig: Take 1 tablet by mouth daily.   omeprazole (PRILOSEC) 20 MG DR capsule 3/2/2025 Morning  Yes Yes   Sig: Take 20 mg by mouth daily.   rosuvastatin (CRESTOR) 20 MG tablet 3/2/2025 Morning  No Yes   Sig: Take 1 tablet (20 mg) by mouth daily   sertraline (ZOLOFT) 100 MG tablet 3/2/2025 Morning  No Yes   Sig: Take 1 tablet (100 mg) by mouth daily.      Facility-Administered Medications: None      Patient Active Problem List   Diagnosis    Anxiety state    Moderate single current episode of major depressive disorder (H)    Attention deficit hyperactivity disorder (ADHD), predominantly inattentive type    Controlled substance agreement signed    Mammogram declined    Mixed hyperlipidemia    Pleomorphic adenoma of submandibular gland      Past Medical History:   Diagnosis Date    Depressive disorder     Gastroesophageal reflux disease without esophagitis     High cholesterol     IUD (intrauterine  "device) in place     Mirena    Major depression       Past Surgical History:   Procedure Laterality Date    CHOLECYSTECTOMY  1997    COLONOSCOPY      EXCISE GLAND SUBMANDIBULAR Right 12/3/2024    Procedure: Right Submandibular Gland Resection;  Surgeon: Augustin Garcia MD;  Location: [facility] OR    EYE SURGERY      [facility]      Social History     Tobacco Use    Smoking status: Never     Passive exposure: Never    Smokeless tobacco: Never   Substance Use Topics    Alcohol use: Yes     Comment: 2 drinks / week      Family History   Problem Relation Age of Onset    Breast Cancer Mother         age 48-     Breast Cancer Maternal Grandmother         age 50's-     Anxiety Disorder Other       PHYSICAL EXAM:   BP (!) 136/92   Pulse 85   Resp 15   Ht 1.676 m (5' 6\")   Wt 70.3 kg (155 lb)   LMP 2006   SpO2 94%   BMI 25.02 kg/m   Estimated body mass index is 25.02 kg/m  as calculated from the following:    Height as of this encounter: 1.676 m (5' 6\").    Weight as of this encounter: 70.3 kg (155 lb).   RESP: lungs clear to auscultation - no rales, rhonchi or wheezes   CV: regular rates and rhythm   ASA Class 2 - Mild systemic disease    Assessment: 58 y/o woman for screening colonoscopy    Plan: Colonoscopy with moderate sedation.  Risks of the procedure were discussed including, but not limited to, bleeding, perforation and missed lesions.  Patient understands and is willing to proceed.    Olu Vieira MD ....................  3/3/2025   8:24 AM  Colon and Rectal Surgery Staff  [phone]     "

## 2025-03-16 DIAGNOSIS — E78.2 MIXED HYPERLIPIDEMIA: ICD-10-CM

## 2025-03-17 RX ORDER — ROSUVASTATIN CALCIUM 20 MG/1
20 TABLET, COATED ORAL DAILY
Qty: 90 TABLET | Refills: 2 | Status: SHIPPED | OUTPATIENT
Start: 2025-03-17

## 2025-03-23 DIAGNOSIS — F32.1 MODERATE SINGLE CURRENT EPISODE OF MAJOR DEPRESSIVE DISORDER (H): ICD-10-CM

## 2025-03-24 RX ORDER — BUPROPION HYDROCHLORIDE 300 MG/1
TABLET ORAL
Qty: 90 TABLET | Refills: 1 | OUTPATIENT
Start: 2025-03-24

## 2025-04-12 ENCOUNTER — HEALTH MAINTENANCE LETTER (OUTPATIENT)
Age: 60
End: 2025-04-12

## 2025-06-26 ENCOUNTER — MYC REFILL (OUTPATIENT)
Dept: FAMILY MEDICINE | Facility: CLINIC | Age: 60
End: 2025-06-26
Payer: COMMERCIAL

## 2025-06-26 DIAGNOSIS — F32.1 MODERATE SINGLE CURRENT EPISODE OF MAJOR DEPRESSIVE DISORDER (H): ICD-10-CM

## 2025-06-26 RX ORDER — BUPROPION HYDROCHLORIDE 300 MG/1
TABLET ORAL
Qty: 90 TABLET | Refills: 1 | Status: SHIPPED | OUTPATIENT
Start: 2025-06-26

## 2025-07-24 ENCOUNTER — MYC REFILL (OUTPATIENT)
Dept: FAMILY MEDICINE | Facility: CLINIC | Age: 60
End: 2025-07-24
Payer: COMMERCIAL

## 2025-07-24 DIAGNOSIS — F90.0 ATTENTION DEFICIT HYPERACTIVITY DISORDER (ADHD), PREDOMINANTLY INATTENTIVE TYPE: ICD-10-CM

## 2025-07-24 RX ORDER — DEXMETHYLPHENIDATE HYDROCHLORIDE 5 MG/1
5 CAPSULE, EXTENDED RELEASE ORAL DAILY
Qty: 30 CAPSULE | Refills: 0 | Status: SHIPPED | OUTPATIENT
Start: 2025-07-24

## (undated) DEVICE — DRSG TEGADERM 4X4 3/4" 1626W

## (undated) DEVICE — SU VICRYL 3-0 SH 8X18" UND J864D

## (undated) DEVICE — SU SILK 2-0 SH CR 8X18" C012D

## (undated) DEVICE — PREP SKIN SCRUB TRAY 4461A

## (undated) DEVICE — PREP POVIDONE-IODINE 10% SOLUTION 4OZ BOTTLE MDS093944

## (undated) DEVICE — Device

## (undated) DEVICE — RETR ELASTIC STAYS LONE STAR BLUNT DUAL LEAD 3550-1G

## (undated) DEVICE — SU ETHILON 3-0 PS-1 18" 1663H

## (undated) DEVICE — BLADE KNIFE SURG 15 371115

## (undated) DEVICE — NIM PROBE NS STD INCR PRASS TIP STRL LF DISP 8225825X

## (undated) DEVICE — SOL WATER IRRIG 1000ML BOTTLE 2F7114

## (undated) DEVICE — SU ETHILON 4-0 PC-3 18" 1864G

## (undated) DEVICE — SU SILK 2-0 TIE 12X30" A305H

## (undated) DEVICE — CLIP HORIZON SM RED WIDE SLOT 001201

## (undated) DEVICE — SU SILK 3-0 TIE 12X30" A304H

## (undated) DEVICE — CLIP HORIZON MED BLUE 002200

## (undated) DEVICE — SPONGE LAP 18X18" X8435

## (undated) DEVICE — LABEL MEDICATION SYSTEM 3303-P

## (undated) DEVICE — ESU FCP BIPOLAR NONSTICK STR 4"X0.4MM W/CORD 19-3002AU

## (undated) DEVICE — STRAP UNIVERSAL POSITIONING 2-PIECE 4X47X76" 91-287

## (undated) DEVICE — PACK NEURO MINOR UMMC SNE32MNMU4

## (undated) DEVICE — PREP POVIDONE-IODINE 7.5% SCRUB 4OZ BOTTLE MDS093945

## (undated) DEVICE — DRAIN JACKSON PRATT RESERVOIR 100ML SU130-1305

## (undated) DEVICE — SPONGE KITTNER 30-101

## (undated) DEVICE — DRAPE SHEET MED 44X70" 9355

## (undated) DEVICE — ESU GROUND PAD ADULT W/CORD E7507

## (undated) DEVICE — SU SILK 4-0 TIE 12X30" A303H

## (undated) DEVICE — ESU ELEC BLADE 2.75" COATED/INSULATED E1455

## (undated) DEVICE — BAG DECANTER STERILE WHITE DYNJDEC09

## (undated) DEVICE — DRAIN JACKSON PRATT 15FR ROUND SU130-1323

## (undated) DEVICE — NIM ELEC SUBDERMAL NDL 3PAIR/BOX

## (undated) RX ORDER — ACETAMINOPHEN 325 MG/1
TABLET ORAL
Status: DISPENSED
Start: 2024-12-03

## (undated) RX ORDER — HYDROMORPHONE HCL IN WATER/PF 6 MG/30 ML
PATIENT CONTROLLED ANALGESIA SYRINGE INTRAVENOUS
Status: DISPENSED
Start: 2024-12-03

## (undated) RX ORDER — ONDANSETRON 2 MG/ML
INJECTION INTRAMUSCULAR; INTRAVENOUS
Status: DISPENSED
Start: 2024-12-03

## (undated) RX ORDER — PROPOFOL 10 MG/ML
INJECTION, EMULSION INTRAVENOUS
Status: DISPENSED
Start: 2024-12-03

## (undated) RX ORDER — MAGNESIUM SULFATE HEPTAHYDRATE 40 MG/ML
INJECTION, SOLUTION INTRAVENOUS
Status: DISPENSED
Start: 2024-12-03

## (undated) RX ORDER — FENTANYL CITRATE 50 UG/ML
INJECTION, SOLUTION INTRAMUSCULAR; INTRAVENOUS
Status: DISPENSED
Start: 2024-12-03

## (undated) RX ORDER — FENTANYL CITRATE 50 UG/ML
INJECTION, SOLUTION INTRAMUSCULAR; INTRAVENOUS
Status: DISPENSED
Start: 2025-03-03

## (undated) RX ORDER — LIDOCAINE HYDROCHLORIDE AND EPINEPHRINE 10; 10 MG/ML; UG/ML
INJECTION, SOLUTION INFILTRATION; PERINEURAL
Status: DISPENSED
Start: 2024-12-03

## (undated) RX ORDER — DEXAMETHASONE SODIUM PHOSPHATE 4 MG/ML
INJECTION, SOLUTION INTRA-ARTICULAR; INTRALESIONAL; INTRAMUSCULAR; INTRAVENOUS; SOFT TISSUE
Status: DISPENSED
Start: 2024-12-03

## (undated) RX ORDER — OXYCODONE HYDROCHLORIDE 5 MG/1
TABLET ORAL
Status: DISPENSED
Start: 2024-12-03